# Patient Record
Sex: FEMALE | Race: ASIAN | NOT HISPANIC OR LATINO | Employment: OTHER | URBAN - METROPOLITAN AREA
[De-identification: names, ages, dates, MRNs, and addresses within clinical notes are randomized per-mention and may not be internally consistent; named-entity substitution may affect disease eponyms.]

---

## 2020-01-11 ENCOUNTER — MEDICAL CORRESPONDENCE (OUTPATIENT)
Dept: HEALTH INFORMATION MANAGEMENT | Facility: CLINIC | Age: 25
End: 2020-01-11

## 2020-09-14 ENCOUNTER — TELEPHONE (OUTPATIENT)
Dept: OBGYN | Facility: CLINIC | Age: 25
End: 2020-09-14

## 2020-09-14 NOTE — TELEPHONE ENCOUNTER
Pt called back.  She just moved to [place] from [place] where she had prenatal care.  Pt states she is about 27 weeks.  She is not sure of exact LMP, but states it was sometime in March, 2020.  Pt's last prenatal visit in [place] was at the end of July.    Pt states she would like to deliver at [facility] and states she is not high risk.    Scheduled pt for a New Prenatal visit with VERENICE Paul CNP, on 9/21 and canceled her OB intake appt since she is already 27 weeks along.  Pt will bring her records from [place] to her appt on 9/21.    Susi De La Garza RN

## 2020-09-14 NOTE — TELEPHONE ENCOUNTER
Pt is scheduled for an OB intake appt with a nurse on Thursday, 9/17.  It appears pt is already 27 weeks.    Called pt to get more information, if she plans on delivering at  hospital, if she has had prenatal care, her LMP and probably assist her in scheduling a New Prenatal visit with one of our  OB/GYN providers instead of the OB intake nurse since she is already 27 weeks along.    Unable to reach patient via phone. Left message to call clinic back at [phone] and ask for Women's Health.    Susi De La Garza, RN

## 2020-09-21 ENCOUNTER — PRENATAL OFFICE VISIT (OUTPATIENT)
Dept: OBGYN | Facility: CLINIC | Age: 25
End: 2020-09-21
Payer: MEDICAID

## 2020-09-21 VITALS
SYSTOLIC BLOOD PRESSURE: 120 MMHG | BODY MASS INDEX: 25.11 KG/M2 | HEART RATE: 91 BPM | HEIGHT: 58 IN | TEMPERATURE: 97.7 F | DIASTOLIC BLOOD PRESSURE: 68 MMHG | WEIGHT: 119.6 LBS | OXYGEN SATURATION: 95 %

## 2020-09-21 DIAGNOSIS — R73.09 ABNORMAL GLUCOSE TOLERANCE TEST: Primary | ICD-10-CM

## 2020-09-21 DIAGNOSIS — Z34.80 SUPERVISION OF OTHER NORMAL PREGNANCY: ICD-10-CM

## 2020-09-21 LAB
GLUCOSE 1H P 50 G GLC PO SERPL-MCNC: 130 MG/DL (ref 60–129)
HGB BLD-MCNC: 10.8 G/DL (ref 11.7–15.7)

## 2020-09-21 PROCEDURE — 86780 TREPONEMA PALLIDUM: CPT | Performed by: NURSE PRACTITIONER

## 2020-09-21 PROCEDURE — 99207 ZZC PRENATAL VISIT: CPT | Performed by: NURSE PRACTITIONER

## 2020-09-21 PROCEDURE — 82950 GLUCOSE TEST: CPT | Performed by: NURSE PRACTITIONER

## 2020-09-21 PROCEDURE — 36415 COLL VENOUS BLD VENIPUNCTURE: CPT | Performed by: NURSE PRACTITIONER

## 2020-09-21 PROCEDURE — 85018 HEMOGLOBIN: CPT | Performed by: NURSE PRACTITIONER

## 2020-09-21 ASSESSMENT — MIFFLIN-ST. JEOR: SCORE: 1187.25

## 2020-09-21 ASSESSMENT — PAIN SCALES - GENERAL: PAINLEVEL: NO PAIN (0)

## 2020-09-21 NOTE — PROGRESS NOTES
Patient presents for routine prenatal visit. Transfer of care from [place].   She has her prenatal records visible for review on her phone.  ASHLEE based on her LMP consistent with second trimester ultrasound.  Screening ultrasound in July-normal.  Labs on 6/5/2020:  CBC WNL  HepBsAg NR  RPR NR  Rubella Immune  O Positive, antibody negative  HIV NR  Urine Culture Neg  Chlamydia Neg  Gonorrhea Neg  Pap smear done in 2018 NL    Current pregnancy without concern. First son born with aortic narrowing and abnormal number of aortic valve cusps. Patient has not had any Maternal Fetal Medicine consultation this pregnancy.    Denies vaginal bleeding, loss of fluid, contractions or cramping.  Patient without complaint.  We reviewed prenatal visit schedule, delivery providers, delivery hospital. Continue PNV. Doing her labs today.  Will think about Tdap and flu vaccine.  Advice as per Anticipatory Guidance/Checklist updated.  PE: See OB vitals    Questions asked and answered. Next OB visit in 2 week(s) with MD.  Plan MFM referral.    Georgiana CALDERA CNP

## 2020-09-22 ENCOUNTER — TELEPHONE (OUTPATIENT)
Dept: OBGYN | Facility: CLINIC | Age: 25
End: 2020-09-22

## 2020-09-22 PROBLEM — D50.9 IRON DEFICIENCY ANEMIA: Status: ACTIVE | Noted: 2020-09-22

## 2020-09-22 LAB — T PALLIDUM AB SER QL: NONREACTIVE

## 2020-09-22 NOTE — TELEPHONE ENCOUNTER
RN spoke with  at [facility] who advised 9/25/2020 at 8:00AM to work pt in for 3 hr glucose.    RN called and LM relaying to pt that she has lab appt scheduled on 9/25/2020 at 8:00AM in [facility] and to call back if this does not work.    Wilda Hanson RN on 9/22/2020 at 9:00 AM

## 2020-09-22 NOTE — TELEPHONE ENCOUNTER
Patient calling. She needs a 3 hr GTT test. The lab schedule is booked out far. Call and advise when she can get this done.

## 2020-09-25 DIAGNOSIS — R73.09 ABNORMAL GLUCOSE TOLERANCE TEST: ICD-10-CM

## 2020-09-25 LAB
GLUCOSE 1H P 100 G GLC PO SERPL-MCNC: 129 MG/DL (ref 60–179)
GLUCOSE 2H P 100 G GLC PO SERPL-MCNC: 134 MG/DL (ref 60–154)
GLUCOSE 3H P 100 G GLC PO SERPL-MCNC: 90 MG/DL (ref 60–139)
GLUCOSE P FAST SERPL-MCNC: 73 MG/DL (ref 60–94)

## 2020-09-25 PROCEDURE — 82952 GTT-ADDED SAMPLES: CPT | Performed by: NURSE PRACTITIONER

## 2020-09-25 PROCEDURE — 36415 COLL VENOUS BLD VENIPUNCTURE: CPT | Performed by: NURSE PRACTITIONER

## 2020-09-25 PROCEDURE — 82951 GLUCOSE TOLERANCE TEST (GTT): CPT | Performed by: NURSE PRACTITIONER

## 2020-09-28 ENCOUNTER — TRANSFERRED RECORDS (OUTPATIENT)
Dept: HEALTH INFORMATION MANAGEMENT | Facility: CLINIC | Age: 25
End: 2020-09-28

## 2020-09-29 ENCOUNTER — TRANSCRIBE ORDERS (OUTPATIENT)
Dept: MATERNAL FETAL MEDICINE | Facility: CLINIC | Age: 25
End: 2020-09-29

## 2020-09-29 DIAGNOSIS — O26.90 PREGNANCY RELATED CONDITION, ANTEPARTUM: Primary | ICD-10-CM

## 2020-10-05 ENCOUNTER — PRENATAL OFFICE VISIT (OUTPATIENT)
Dept: OBGYN | Facility: CLINIC | Age: 25
End: 2020-10-05
Payer: COMMERCIAL

## 2020-10-05 VITALS
HEART RATE: 73 BPM | WEIGHT: 120 LBS | TEMPERATURE: 96.9 F | SYSTOLIC BLOOD PRESSURE: 99 MMHG | OXYGEN SATURATION: 97 % | BODY MASS INDEX: 25.19 KG/M2 | DIASTOLIC BLOOD PRESSURE: 67 MMHG | HEIGHT: 58 IN

## 2020-10-05 DIAGNOSIS — Z23 NEED FOR TDAP VACCINATION: ICD-10-CM

## 2020-10-05 DIAGNOSIS — Z34.80 SUPERVISION OF OTHER NORMAL PREGNANCY: Primary | ICD-10-CM

## 2020-10-05 PROCEDURE — 90471 IMMUNIZATION ADMIN: CPT

## 2020-10-05 PROCEDURE — 90715 TDAP VACCINE 7 YRS/> IM: CPT

## 2020-10-05 PROCEDURE — 99207 PR PRENATAL VISIT: CPT | Performed by: NURSE PRACTITIONER

## 2020-10-05 ASSESSMENT — PAIN SCALES - GENERAL: PAINLEVEL: NO PAIN (0)

## 2020-10-05 ASSESSMENT — MIFFLIN-ST. JEOR: SCORE: 1189.07

## 2020-10-05 NOTE — PROGRESS NOTES
Patient presents for routine prenatal visit. Prenatal flowsheet reviewed and updated as needed.  Denies vaginal bleeding, loss of fluid, contractions or cramping. Denies headache, nausea/vomiting, upper abdominal pain, vision changes, lower extremity swelling, chest pain or shortness of breath.  Patient without complaint. Tdap given.  Fetal ECHO scheduled.  Advice as per Anticipatory Guidance/Checklist updated.  PE: See OB vitals    Questions asked and answered. Next OB visit in 2 week(s) with Dr. Mckeon.    Georgiana CALDERA CNP

## 2020-10-05 NOTE — PROGRESS NOTES
Prior to immunization administration, verified patients identity using patient s name and date of birth. Please see Immunization Activity for additional information.     Screening Questionnaire for Adult Immunization    Are you sick today?   No   Do you have allergies to medications, food, a vaccine component or latex?   No   Have you ever had a serious reaction after receiving a vaccination?   No   Do you have a long-term health problem with heart, lung, kidney, or metabolic disease (e.g., diabetes), asthma, a blood disorder, no spleen, complement component deficiency, a cochlear implant, or a spinal fluid leak?  Are you on long-term aspirin therapy?   No   Do you have cancer, leukemia, HIV/AIDS, or any other immune system problem?   No   Do you have a parent, brother, or sister with an immune system problem?   No   In the past 3 months, have you taken medications that affect  your immune system, such as prednisone, other steroids, or anticancer drugs; drugs for the treatment of rheumatoid arthritis, Crohn s disease, or psoriasis; or have you had radiation treatments?   No   Have you had a seizure, or a brain or other nervous system problem?   No   During the past year, have you received a transfusion of blood or blood    products, or been given immune (gamma) globulin or antiviral drug?   No   For women: Are you pregnant or is there a chance you could become       pregnant during the next month?   Yes   Have you received any vaccinations in the past 4 weeks?   No     Immunization questionnaire was positive for at least one answer.  Notified Georgiana CALDERA CNP.        Per orders of Georgaina CALDERA CNP, injection of Tdap given by Cookie Montano CMA. Patient instructed to remain in clinic for 15 minutes afterwards, and to report any adverse reaction to me immediately.       Screening performed by Cookie Montano CMA on 10/5/2020 at 9:37 AM.

## 2020-10-16 ENCOUNTER — HOSPITAL ENCOUNTER (OUTPATIENT)
Dept: CARDIOLOGY | Facility: CLINIC | Age: 25
Discharge: HOME | End: 2020-10-16
Admitting: PEDIATRICS
Payer: COMMERCIAL

## 2020-10-16 DIAGNOSIS — O26.90 PREGNANCY RELATED CONDITION, ANTEPARTUM: ICD-10-CM

## 2020-10-16 PROCEDURE — 99201 PR OFFICE/OUTPT VISIT, NEW, LEVEL I: CPT | Mod: 25 | Performed by: PEDIATRICS

## 2020-10-16 PROCEDURE — 76827 ECHO EXAM OF FETAL HEART: CPT | Mod: 26 | Performed by: PEDIATRICS

## 2020-10-16 PROCEDURE — 76827 ECHO EXAM OF FETAL HEART: CPT

## 2020-10-16 PROCEDURE — 76825 ECHO EXAM OF FETAL HEART: CPT | Mod: 26 | Performed by: PEDIATRICS

## 2020-10-16 PROCEDURE — 93325 DOPPLER ECHO COLOR FLOW MAPG: CPT | Mod: 26 | Performed by: PEDIATRICS

## 2020-10-16 NOTE — PROGRESS NOTES
Fetal Cardiology Consultation    Patient:  Sailaja Mcneal MRN:  [id number]   YOB: 1995 Age:  25 year old   Date of Visit:  10/16/2020 PCP:  Georgiana Estevez APRN CNP   ASHLEE: 2020 EGA: 32+5 weeks     Dear Doctor:    I had the pleasure of seeing Sailaja Mcneal at the [facility] in [place] on 10/16/2020 in consultation for fetal echocardiography results. She presented today by herself. As you know, she is a 25 year old female with previous child with bicuspid aortic valve and aortic arch hypoplasia.    The fetal echocardiogram was normal. Normal fetal cardiac anatomy. Normal right and left ventricular size and function without hypertrophy. No evidence of diastolic dysfunction. No pericardial effusion. No arrhythmia.     I reviewed and interpreted the fetal echocardiogram today. I discussed the normal results with Ms. Mcneal. While these results are normal, it is important to note that fetal echocardiography cannot exclude small atrial or ventricular septal defects, persistent ductus arteriosus, mild coarctation of the aorta, partial anomalous pulmonary venous return, minor anatomic valve anomalies, or coronary artery anomalies.     Thank you for allowing me to participate in Ms. Mcneal's care. Please don't hesitate to contact me or the Fetal Cardiology team at [facility] with any questions or concerns.     -- A post-yuniel echocardiogram as an outpatient within several weeks after delivery is recommended.    I spent a total of 15 minutes face-to-face with Ms. Mcneal during today's office visit. Over 50% of this time was spent counseling the patient and/or coordinating care regarding the fetal echocardiography results.     Rigoberto Morley MD  Pediatric Cardiology  [facility]  Phone [phone]    Addendum: Original note was information for an incorrect patient.

## 2020-10-19 ENCOUNTER — PRENATAL OFFICE VISIT (OUTPATIENT)
Dept: OBGYN | Facility: CLINIC | Age: 25
End: 2020-10-19
Payer: COMMERCIAL

## 2020-10-19 VITALS
WEIGHT: 121.2 LBS | HEART RATE: 107 BPM | SYSTOLIC BLOOD PRESSURE: 101 MMHG | BODY MASS INDEX: 25.44 KG/M2 | DIASTOLIC BLOOD PRESSURE: 64 MMHG | OXYGEN SATURATION: 98 % | HEIGHT: 58 IN

## 2020-10-19 DIAGNOSIS — Z34.80 SUPERVISION OF OTHER NORMAL PREGNANCY: Primary | ICD-10-CM

## 2020-10-19 PROCEDURE — 99207 PR PRENATAL VISIT: CPT | Performed by: OBSTETRICS & GYNECOLOGY

## 2020-10-19 ASSESSMENT — MIFFLIN-ST. JEOR: SCORE: 1189.51

## 2020-10-19 ASSESSMENT — PAIN SCALES - GENERAL: PAINLEVEL: MILD PAIN (2)

## 2020-10-19 NOTE — PROGRESS NOTES
Patient presents for routine prenatal visit at 33w1d.  Patient without complaint.   PE: See OB vitals  There is no height or weight on file to calculate BMI.    No vaginal bleeding, loss of fluid, or contractions    Questions asked and answered.      Frank Mckeon MD FACOG

## 2020-11-05 ENCOUNTER — PRENATAL OFFICE VISIT (OUTPATIENT)
Dept: OBGYN | Facility: CLINIC | Age: 25
End: 2020-11-05
Payer: COMMERCIAL

## 2020-11-05 VITALS
HEIGHT: 58 IN | HEART RATE: 116 BPM | DIASTOLIC BLOOD PRESSURE: 68 MMHG | BODY MASS INDEX: 25.27 KG/M2 | WEIGHT: 120.4 LBS | SYSTOLIC BLOOD PRESSURE: 103 MMHG | OXYGEN SATURATION: 97 %

## 2020-11-05 DIAGNOSIS — D50.9 IRON DEFICIENCY ANEMIA, UNSPECIFIED IRON DEFICIENCY ANEMIA TYPE: ICD-10-CM

## 2020-11-05 DIAGNOSIS — Z34.80 SUPERVISION OF OTHER NORMAL PREGNANCY: Primary | ICD-10-CM

## 2020-11-05 LAB — HGB BLD-MCNC: 11.6 G/DL (ref 11.7–15.7)

## 2020-11-05 PROCEDURE — 99207 PR PRENATAL VISIT: CPT | Performed by: OBSTETRICS & GYNECOLOGY

## 2020-11-05 PROCEDURE — 85018 HEMOGLOBIN: CPT | Performed by: OBSTETRICS & GYNECOLOGY

## 2020-11-05 PROCEDURE — 36415 COLL VENOUS BLD VENIPUNCTURE: CPT | Performed by: OBSTETRICS & GYNECOLOGY

## 2020-11-05 ASSESSMENT — PAIN SCALES - GENERAL: PAINLEVEL: NO PAIN (0)

## 2020-11-05 ASSESSMENT — MIFFLIN-ST. JEOR: SCORE: 1185.88

## 2020-11-05 NOTE — PROGRESS NOTES
Patient presents for routine prenatal visit at 35w4d.  Patient without complaint.   PE: See OB vitals  Body mass index is 25.16 kg/m .    Doing well.  No concerns today.  No vaginal bleeding, loss of fluid, or contractions    Questions asked and answered.      Frank Mckeon MD FACOG

## 2020-11-09 ENCOUNTER — PRENATAL OFFICE VISIT (OUTPATIENT)
Dept: OBGYN | Facility: CLINIC | Age: 25
End: 2020-11-09
Payer: COMMERCIAL

## 2020-11-09 VITALS
HEART RATE: 97 BPM | WEIGHT: 122.2 LBS | BODY MASS INDEX: 25.65 KG/M2 | DIASTOLIC BLOOD PRESSURE: 67 MMHG | OXYGEN SATURATION: 98 % | TEMPERATURE: 97.9 F | SYSTOLIC BLOOD PRESSURE: 100 MMHG | HEIGHT: 58 IN

## 2020-11-09 DIAGNOSIS — Z34.80 SUPERVISION OF OTHER NORMAL PREGNANCY: Primary | ICD-10-CM

## 2020-11-09 DIAGNOSIS — Z23 NEED FOR PROPHYLACTIC VACCINATION AND INOCULATION AGAINST INFLUENZA: ICD-10-CM

## 2020-11-09 PROCEDURE — 90686 IIV4 VACC NO PRSV 0.5 ML IM: CPT | Performed by: NURSE PRACTITIONER

## 2020-11-09 PROCEDURE — 90471 IMMUNIZATION ADMIN: CPT | Performed by: NURSE PRACTITIONER

## 2020-11-09 PROCEDURE — 87653 STREP B DNA AMP PROBE: CPT | Performed by: NURSE PRACTITIONER

## 2020-11-09 PROCEDURE — 99207 PR PRENATAL VISIT: CPT | Performed by: NURSE PRACTITIONER

## 2020-11-09 ASSESSMENT — MIFFLIN-ST. JEOR: SCORE: 1194.05

## 2020-11-09 ASSESSMENT — PAIN SCALES - GENERAL: PAINLEVEL: NO PAIN (0)

## 2020-11-09 NOTE — PROGRESS NOTES
Patient presents for routine prenatal visit. Prenatal flowsheet reviewed and updated as needed.  Denies vaginal bleeding, loss of fluid, contractions or cramping. Denies headache, nausea/vomiting, upper abdominal pain, vision changes, lower extremity swelling, chest pain or shortness of breath.  Patient without complaint. GBS done.  Discussed pediatrician and circumcision question.   Advice as per Anticipatory Guidance/Checklist updated.  PE: See OB vitals    Questions asked and answered. Next OB visit in 1 week(s) with Dr. Mckeon.    Georgiana CALDERA CNP

## 2020-11-10 LAB
GP B STREP DNA SPEC QL NAA+PROBE: NEGATIVE
SPECIMEN SOURCE: NORMAL

## 2020-11-16 ENCOUNTER — PRENATAL OFFICE VISIT (OUTPATIENT)
Dept: OBGYN | Facility: CLINIC | Age: 25
End: 2020-11-16
Payer: COMMERCIAL

## 2020-11-16 VITALS
OXYGEN SATURATION: 96 % | HEART RATE: 86 BPM | BODY MASS INDEX: 25.69 KG/M2 | SYSTOLIC BLOOD PRESSURE: 109 MMHG | TEMPERATURE: 97.2 F | WEIGHT: 122.4 LBS | HEIGHT: 58 IN | DIASTOLIC BLOOD PRESSURE: 71 MMHG

## 2020-11-16 DIAGNOSIS — Z34.80 SUPERVISION OF OTHER NORMAL PREGNANCY: Primary | ICD-10-CM

## 2020-11-16 PROCEDURE — 99207 PR PRENATAL VISIT: CPT | Performed by: NURSE PRACTITIONER

## 2020-11-16 ASSESSMENT — MIFFLIN-ST. JEOR: SCORE: 1194.95

## 2020-11-16 ASSESSMENT — PAIN SCALES - GENERAL: PAINLEVEL: NO PAIN (0)

## 2020-11-16 NOTE — PROGRESS NOTES
Patient presents for routine prenatal visit. Prenatal flowsheet reviewed and updated as needed.  Denies vaginal bleeding, loss of fluid, contractions or cramping. Denies headache, nausea/vomiting, upper abdominal pain, vision changes, lower extremity swelling, chest pain or shortness of breath.  Patient without complaint. Reviewed signs and symptoms of labor and when to proceed to L&D.    Discussed COVID-19 testing at 38 weeks and then need to quarantine. Plan ultrasound for position at next visit.    Advice as per Anticipatory Guidance/Checklist updated.  PE: See OB vitals    Questions asked and answered. Next OB visit in 1 week(s) with Dr. Mckeon.    Georgiana CALDERA CNP

## 2020-11-23 ENCOUNTER — PRENATAL OFFICE VISIT (OUTPATIENT)
Dept: OBGYN | Facility: CLINIC | Age: 25
End: 2020-11-23
Payer: COMMERCIAL

## 2020-11-23 ENCOUNTER — NURSE TRIAGE (OUTPATIENT)
Dept: NURSING | Facility: CLINIC | Age: 25
End: 2020-11-23

## 2020-11-23 VITALS
BODY MASS INDEX: 25.71 KG/M2 | HEART RATE: 102 BPM | WEIGHT: 123 LBS | SYSTOLIC BLOOD PRESSURE: 112 MMHG | OXYGEN SATURATION: 98 % | DIASTOLIC BLOOD PRESSURE: 72 MMHG

## 2020-11-23 DIAGNOSIS — Z34.80 SUPERVISION OF OTHER NORMAL PREGNANCY: Primary | ICD-10-CM

## 2020-11-23 PROCEDURE — 99207 PR PRENATAL VISIT: CPT | Performed by: OBSTETRICS & GYNECOLOGY

## 2020-11-23 PROCEDURE — U0003 INFECTIOUS AGENT DETECTION BY NUCLEIC ACID (DNA OR RNA); SEVERE ACUTE RESPIRATORY SYNDROME CORONAVIRUS 2 (SARS-COV-2) (CORONAVIRUS DISEASE [COVID-19]), AMPLIFIED PROBE TECHNIQUE, MAKING USE OF HIGH THROUGHPUT TECHNOLOGIES AS DESCRIBED BY CMS-2020-01-R: HCPCS | Performed by: OBSTETRICS & GYNECOLOGY

## 2020-11-23 ASSESSMENT — PAIN SCALES - GENERAL: PAINLEVEL: NO PAIN (0)

## 2020-11-23 NOTE — PROGRESS NOTES
Patient presents for routine prenatal visit at 38w1d.  Patient without complaint.   PE: See OB vitals  Body mass index is 25.71 kg/m .  Doing well.  No concerns today.  No vaginal bleeding, LOF, contractions.  No HA, RUQ pain, N/V, visual changes.  Had an exposure to Covid.  Will screen  Questions asked and answered.      Frank Mckeon MD FACOG

## 2020-11-23 NOTE — TELEPHONE ENCOUNTER
"Patient has questions regarding COVID testing.    Yvette Olson RN on 11/23/2020 at 9:34 AM    COVID 19 Nurse Triage Plan/Patient Instructions    Please be aware that novel coronavirus (COVID-19) may be circulating in the community. If you develop symptoms such as fever, cough, or SOB or if you have concerns about the presence of another infection including coronavirus (COVID-19), please contact your health care provider or visit [email or web address].     Disposition/Instructions    Virtual Visit with provider recommended. Reference Visit Selection Guide.  Additional COVID19 information to add for patients.   How can I protect others?  If you have symptoms (fever, cough, body aches or trouble breathing): Stay home and away from others (self-isolate) until:    At least 10 days have passed since your symptoms started, And     You ve had no fever--and no medicine that reduces fever--for 1 full day (24 hours), And      Your other symptoms have resolved (gotten better).     If you don t have symptoms, but a test showed that you have COVID-19 (you tested positive):    Stay home and away from others (self-isolate). Follow the tips under \"How do I self-isolate?\" below for 10 days (20 days if you have a weak immune system).    You don't need to be retested for COVID-19 before going back to school or work. As long as you're fever-free and feeling better, you can go back to school, work and other activities after waiting the 10 or 20 days.     How do I self-isolate?    Stay in your own room, even for meals. Use your own bathroom if you can.     Stay away from others in your home. No hugging, kissing or shaking hands. No visitors.    Don t go to work, school or anywhere else.     Clean  high touch  surfaces often (doorknobs, counters, handles, etc.). Use a household cleaning spray or wipes. You ll find a full list on the EPA website:  www.epa.gov/pesticide-registration/list-n-disinfectants-use-against-sars-cov-2.    Cover your " mouth and nose with a mask, tissue or washcloth to avoid spreading germs.    Wash your hands and face often. Use soap and water.    Caregivers in these groups are at risk for severe illness due to COVID-19:  o People 65 years and older  o People who live in a nursing home or long-term care facility  o People with chronic disease (lung, heart, cancer, diabetes, kidney, liver, immunologic)  o People who have a weakened immune system, including those who:  - Are in cancer treatment  - Take medicine that weakens the immune system, such as corticosteroids  - Had a bone marrow or organ transplant  - Have an immune deficiency  - Have poorly controlled HIV or AIDS  - Are obese (body mass index of 40 or higher)  - Smoke regularly    Caregivers should wear gloves while washing dishes, handling laundry and cleaning bedrooms and bathrooms.    Use caution when washing and drying laundry: Don t shake dirty laundry, and use the warmest water setting that you can.    For more tips, go to www.cdc.gov/coronavirus/2019-ncov/downloads/10Things.pdf.    How can I take care of myself?  1. Get lots of rest. Drink extra fluids (unless a doctor has told you not to).     2. Take Tylenol (acetaminophen) for fever or pain. If you have liver or kidney problems, ask your family doctor if it s okay to take Tylenol.     Adults can take either:     650 mg (two 325 mg pills) every 4 to 6 hours, or     1,000 mg (two 500 mg pills) every 8 hours as needed.     Note: Don t take more than 3,000 mg in one day.   Acetaminophen is found in many medicines (both prescribed and over-the-counter medicines). Read all labels to be sure you don t take too much.     For children, check the Tylenol bottle for the right dose. The dose is based on the child s age or weight.    3. If you have other health problems (like cancer, heart failure, an organ transplant or severe kidney disease): Call your specialty clinic if you don t feel better in the next 2 days.    4. Know  when to call 911: Emergency warning signs include:    Trouble breathing or shortness of breath    Pain or pressure in the chest that doesn t go away    Feeling confused like you haven t felt before, or not being able to wake up    Bluish-colored lips or face    What are the symptoms of COVID-19?     The most common symptoms are cough, fever and trouble breathing.     Less common symptoms include body aches, chills, diarrhea (loose, watery poops), fatigue (feeling very tired), headache, runny nose, sore throat and loss of smell.    COVID-19 can cause severe coughing (bronchitis) and lung infection (pneumonia).    How does it spread?     The virus may spread when a person coughs or sneezes into the air. The virus can travel about 6 feet this way, and it can live on surfaces.      Common  (household disinfectants) will kill the virus.    Who is at risk?  Anyone can catch COVID-19 if they re around someone who has the virus.    How can others protect themselves?     Stay away from people who have COVID-19 (or symptoms of COVID-19).    Wash hands often with soap and water. Or, use hand  with at least 60% alcohol.    Avoid touching the eyes, nose or mouth.     Wear a face mask when you go out in public, when sick or when caring for a sick person.    Where can I get more information?    [facility]: About COVID-19: [email or web address]    CDC: What to Do If You re Sick: www.cdc.gov/coronavirus/2019-ncov/about/steps-when-sick.html    CDC: Ending Home Isolation: www.cdc.gov/coronavirus/2019-ncov/hcp/disposition-in-home-patients.html     CDC: Caring for Someone: www.cdc.gov/coronavirus/2019-ncov/if-you-are-sick/care-for-someone.html     [facility]: Interim Guidance for Hospital Discharge to Home: www.health.[facility].[place]/diseases/coronavirus/hcp/hospdischarge.pdf    [facility] clinical trials (COVID-19 research studies): clinicalaffairs.[facility]-clinical-trials     Below are the  COVID-19 hotlines at the [place] Department of Health ([facility]). Interpreters are available.   o For health questions: Call 976-352-2119 or 1-466.574.6303 (7 a.m. to 7 p.m.)  o For questions about schools and childcare: Call 495-634-7034 or 1-800.680.7075 (7 a.m. to 7 p.m.)          Thank you for taking steps to prevent the spread of this virus.  o Limit your contact with others.  o Wear a simple mask to cover your cough.  o Wash your hands well and often.    Resources    [facility]: About COVID-19: [email or web address]    CDC: What to Do If You're Sick: www.cdc.gov/coronavirus/2019-ncov/about/steps-when-sick.html    CDC: Ending Home Isolation: www.cdc.gov/coronavirus/2019-ncov/hcp/disposition-in-home-patients.html     CDC: Caring for Someone: www.cdc.gov/coronavirus/2019-ncov/if-you-are-sick/care-for-someone.html     [facility]: Interim Guidance for Hospital Discharge to Home: www.[facility].[place]./diseases/coronavirus/hcp/hospdischarge.pdf    [facility] clinical trials (COVID-19 research studies): clinicalaffairs.[facility]/n-clinical-trials     Below are the COVID-19 hotlines at the [place] Department of [facility] ([facility]). Interpreters are available.   o For health questions: Call 615-556-9649 or 1-997.925.3741 (7 a.m. to 7 p.m.)  o For questions about schools and childcare: Call 498-438-2577 or 1-107.178.6158 (7 a.m. to 7 p.m.)

## 2020-11-24 LAB
SARS-COV-2 RNA SPEC QL NAA+PROBE: NOT DETECTED
SPECIMEN SOURCE: NORMAL

## 2020-11-26 ENCOUNTER — MYC MEDICAL ADVICE (OUTPATIENT)
Dept: OBGYN | Facility: CLINIC | Age: 25
End: 2020-11-26

## 2020-12-03 ENCOUNTER — PRENATAL OFFICE VISIT (OUTPATIENT)
Dept: OBGYN | Facility: CLINIC | Age: 25
End: 2020-12-03
Payer: COMMERCIAL

## 2020-12-03 VITALS
DIASTOLIC BLOOD PRESSURE: 69 MMHG | HEART RATE: 115 BPM | BODY MASS INDEX: 26.17 KG/M2 | SYSTOLIC BLOOD PRESSURE: 103 MMHG | WEIGHT: 125.2 LBS

## 2020-12-03 DIAGNOSIS — Z34.80 SUPERVISION OF OTHER NORMAL PREGNANCY: Primary | ICD-10-CM

## 2020-12-03 DIAGNOSIS — D50.8 OTHER IRON DEFICIENCY ANEMIA: ICD-10-CM

## 2020-12-03 PROCEDURE — 99207 PR PRENATAL VISIT: CPT | Performed by: OBSTETRICS & GYNECOLOGY

## 2020-12-03 PROCEDURE — 59426 ANTEPARTUM CARE ONLY: CPT | Performed by: OBSTETRICS & GYNECOLOGY

## 2020-12-03 NOTE — PROGRESS NOTES
Patient presents for routine prenatal visit at 39w4d.  Patient without complaint.   PE: See OB vitals  Body mass index is 26.17 kg/m .  Doing well.  No concerns today.  No vaginal bleeding, LOF, contractions.  No HA, RUQ pain, N/V, visual changes.  Questions asked and answered.      Frank Mckeon MD FACOG

## 2021-01-04 ENCOUNTER — HEALTH MAINTENANCE LETTER (OUTPATIENT)
Age: 26
End: 2021-01-04

## 2021-01-11 ENCOUNTER — PRENATAL OFFICE VISIT (OUTPATIENT)
Dept: OBGYN | Facility: CLINIC | Age: 26
End: 2021-01-11
Payer: COMMERCIAL

## 2021-01-11 VITALS
DIASTOLIC BLOOD PRESSURE: 79 MMHG | HEART RATE: 86 BPM | HEIGHT: 58 IN | OXYGEN SATURATION: 94 % | WEIGHT: 108 LBS | SYSTOLIC BLOOD PRESSURE: 124 MMHG | BODY MASS INDEX: 22.67 KG/M2 | TEMPERATURE: 97.9 F

## 2021-01-11 DIAGNOSIS — Z30.011 ENCOUNTER FOR INITIAL PRESCRIPTION OF CONTRACEPTIVE PILLS: ICD-10-CM

## 2021-01-11 PROBLEM — Z34.80 SUPERVISION OF OTHER NORMAL PREGNANCY: Status: RESOLVED | Noted: 2020-09-21 | Resolved: 2021-01-11

## 2021-01-11 PROCEDURE — G0145 SCR C/V CYTO,THINLAYER,RESCR: HCPCS | Performed by: NURSE PRACTITIONER

## 2021-01-11 PROCEDURE — 99207 PR POST PARTUM EXAM: CPT | Performed by: NURSE PRACTITIONER

## 2021-01-11 RX ORDER — LEVONORGESTREL/ETHIN.ESTRADIOL 0.1-0.02MG
1 TABLET ORAL DAILY
Qty: 84 TABLET | Refills: 3 | Status: SHIPPED | OUTPATIENT
Start: 2021-01-11 | End: 2021-08-13

## 2021-01-11 ASSESSMENT — PATIENT HEALTH QUESTIONNAIRE - PHQ9
SUM OF ALL RESPONSES TO PHQ QUESTIONS 1-9: 1
SUM OF ALL RESPONSES TO PHQ QUESTIONS 1-9: 1

## 2021-01-11 ASSESSMENT — MIFFLIN-ST. JEOR: SCORE: 1129.63

## 2021-01-11 ASSESSMENT — PAIN SCALES - GENERAL: PAINLEVEL: NO PAIN (0)

## 2021-01-11 NOTE — PROGRESS NOTES
Sailaja is here for a postpartum checkup.    She had a   OB History    Para Term  AB Living   2 2 2 0 0 2   SAB TAB Ectopic Multiple Live Births   0 0 0 0 2      # Outcome Date GA Lbr Edu/2nd Weight Sex Delivery Anes PTL Lv   2 Term 20 40w2d  3.6 kg (7 lb 15 oz) M  None N MARISOL      Apgar1: 8  Apgar5: 9   1 Term 2019 40w0d       MARISOL      Since delivery, she has been pumping, currently expanding times between pumping sessions with plan to discontinue soon.  She has had a normal menses-just started.  She has not had intercourse.  Patient screened for postpartum depression and complaints are NEGATIVE. Screening has also been completed for intimate partner violence. She would like to discuss contraception.    O: This is a well appearing female in no acute distress. Answers questions and maintains eye contact appropriately. Vital signs noted.  ABDOMEN: Soft, nontender, nondistended, normoactive bowel sounds. No hepatosplenomegaly. No guarding, rebounding, or rigidity.  Vulva: No external lesions, normal hair distribution, no adenopathy  BUS:  Normal, no masses noted  Vagina: Moist, pink, no abnormal discharge, well rugated, no lesions  Cervix: Pink, parous, midline. Without cervical motion tenderness.  Uterus: Normal size and shape, non-tender, mobile  Ovaries: No masses, non-tender, mobile    A/P:  (Z39.2) Routine postpartum follow-up  (primary encounter diagnosis)  Comment:  Discussed the new pap recommendations regarding screening.  Explained the rationale for increased intervals between paps.  Questions asked and answered.  She does agree to this regiment.  Plan: Pap imaged thin layer screen only - recommended        age 21 - 24 years        (Z30.011) Encounter for initial prescription of contraceptive pills  Comment: Pumping with plan to discontinue soon. Discussed options and she would like a combined oral contraceptive pill. Discussed when to start the pill, taking it at the same  time every day, possible side effects she may experience and when it will become effective.   Plan: levonorgestrel-ethinyl estradiol (AVIANE)         0.1-20 MG-MCG tablet        Georgiana CALDERA CNP      Answers for HPI/ROS submitted by the patient on 1/11/2021   PHQ9 TOTAL SCORE: 1

## 2021-01-12 ASSESSMENT — PATIENT HEALTH QUESTIONNAIRE - PHQ9: SUM OF ALL RESPONSES TO PHQ QUESTIONS 1-9: 1

## 2021-01-13 LAB
COPATH REPORT: NORMAL
PAP: NORMAL

## 2021-01-27 NOTE — PROGRESS NOTES
"  Assessment & Plan     Vaginal discharge  Patient notified of results, during visit we had discussed management if test was negative, reviewed home care measures she can try. Discussed use of OTC Hydrocortisone for vulvar itching sparingly twice daily as needed for up to 1 week. Will monitor symptoms until after next cycle and let me know if they continue.  She was amenable to the plan.   - Wet prep    VERENICE Johnson CNP  [facility]    Jeremie Menard is a 25 year old who presents to clinic today for the following health issues    HPI     Vaginal Symptoms  Onset/Duration: 1 week ago  Description:  Vaginal Discharge: yellow   Itching (Pruritis): YES  Burning sensation:  no  Odor: YES  Accompanying Signs & Symptoms:  Urinary symptoms: no  Abdominal pain: no  Fever: no  History:   Sexually active: YES  New Partner: no  Possibility of Pregnancy:  no  Recent antibiotic use: no  Previous vaginitis issues: no  Precipitating or alleviating factors: None  Therapies tried and outcome: none    Symptoms began as menses was ending. Increase in thick vaginal discharge, odor and mild itching. Denies abnormal urinary symptoms, pelvic pain, STI concerns, fever, nausea. Changed body wash about a month ago, otherwise no new products.    Review of Systems   Constitutional, HEENT, cardiovascular, pulmonary, gi and gu systems are negative, except as otherwise noted.      Objective    /73 (BP Location: Right arm, Patient Position: Sitting, Cuff Size: Adult Regular)   Pulse 91   Temp 98.6  F (37  C) (Tympanic)   Ht 1.473 m (4' 10\")   Wt 48.4 kg (106 lb 9.6 oz)   LMP 01/10/2021 (Approximate)   SpO2 97%   BMI 22.28 kg/m    Body mass index is 22.28 kg/m .  Physical Exam   GENERAL: healthy, alert and no distress  ABDOMEN: soft, nontender, no hepatosplenomegaly, no masses and bowel sounds normal   (female): normal female external genitalia, normal urethral meatus , vaginal mucosa pink, " moist, well rugated and vaginal discharge - small amount of yellow and thick  MS: no gross musculoskeletal defects noted, no edema  SKIN: no suspicious lesions or rashes  PSYCH: mentation appears normal, affect normal/bright    Results for orders placed or performed in visit on 01/29/21 (from the past 24 hour(s))   Wet prep    Specimen: Vagina   Result Value Ref Range    Specimen Description Vagina     Wet Prep No Trichomonas seen     Wet Prep No clue cells seen     Wet Prep No yeast seen     Wet Prep Few  WBC'S seen

## 2021-01-29 ENCOUNTER — OFFICE VISIT (OUTPATIENT)
Dept: OBGYN | Facility: CLINIC | Age: 26
End: 2021-01-29
Payer: COMMERCIAL

## 2021-01-29 VITALS
TEMPERATURE: 98.6 F | BODY MASS INDEX: 22.37 KG/M2 | DIASTOLIC BLOOD PRESSURE: 73 MMHG | SYSTOLIC BLOOD PRESSURE: 106 MMHG | HEIGHT: 58 IN | HEART RATE: 91 BPM | WEIGHT: 106.6 LBS | OXYGEN SATURATION: 97 %

## 2021-01-29 DIAGNOSIS — N89.8 VAGINAL DISCHARGE: Primary | ICD-10-CM

## 2021-01-29 LAB
SPECIMEN SOURCE: NORMAL
WET PREP SPEC: NORMAL

## 2021-01-29 PROCEDURE — 99213 OFFICE O/P EST LOW 20 MIN: CPT | Performed by: NURSE PRACTITIONER

## 2021-01-29 PROCEDURE — 87210 SMEAR WET MOUNT SALINE/INK: CPT | Performed by: NURSE PRACTITIONER

## 2021-01-29 ASSESSMENT — PAIN SCALES - GENERAL: PAINLEVEL: NO PAIN (0)

## 2021-01-29 ASSESSMENT — MIFFLIN-ST. JEOR: SCORE: 1123.28

## 2021-02-11 ENCOUNTER — MEDICAL CORRESPONDENCE (OUTPATIENT)
Dept: HEALTH INFORMATION MANAGEMENT | Facility: CLINIC | Age: 26
End: 2021-02-11

## 2021-04-08 ENCOUNTER — MEDICAL CORRESPONDENCE (OUTPATIENT)
Dept: HEALTH INFORMATION MANAGEMENT | Facility: CLINIC | Age: 26
End: 2021-04-08

## 2021-04-28 ENCOUNTER — IMMUNIZATION (OUTPATIENT)
Dept: NURSING | Facility: CLINIC | Age: 26
End: 2021-04-28
Payer: COMMERCIAL

## 2021-04-28 PROCEDURE — 91300 PR COVID VAC PFIZER DIL RECON 30 MCG/0.3 ML IM: CPT

## 2021-04-28 PROCEDURE — 0001A PR COVID VAC PFIZER DIL RECON 30 MCG/0.3 ML IM: CPT

## 2021-05-19 ENCOUNTER — IMMUNIZATION (OUTPATIENT)
Dept: NURSING | Facility: CLINIC | Age: 26
End: 2021-05-19
Attending: FAMILY MEDICINE
Payer: COMMERCIAL

## 2021-05-19 PROCEDURE — 0002A PR COVID VAC PFIZER DIL RECON 30 MCG/0.3 ML IM: CPT

## 2021-05-19 PROCEDURE — 91300 PR COVID VAC PFIZER DIL RECON 30 MCG/0.3 ML IM: CPT

## 2021-07-06 ENCOUNTER — MYC MEDICAL ADVICE (OUTPATIENT)
Dept: OBGYN | Facility: CLINIC | Age: 26
End: 2021-07-06

## 2021-07-06 NOTE — TELEPHONE ENCOUNTER
May be related to her Multivitamin, but is not usually a side effect of the oral contraceptive pill-especially after this many months. If symptoms persist, can be evaluated by primary care. They can also check for any iron deficiency. Georgiana CALDERA CNP

## 2021-07-06 NOTE — TELEPHONE ENCOUNTER
Pt last seen 1/29/2021 for vaginal discharge. Pt currently taking levonorgestrel-ethinyl estradiol (AVIANE) 0.1-20 MG-MCG tablet    Pt having concerns for indigestion since starting on this BC and asking if there could be any correlation.    Pt denies any lifestyle or diet changes. Pt thinks she is having acid reflux, states she has regular BMs. Pt states she has not noticed changes in indigestion throughout her cycle. Started taking multivitamins in July, thinks she maybe had an iron deficiency.    RN routing to provider for advisement if BCPs could have effect on indigestion or if pt should f/u with FP.    Wilda Hanson RN on 7/6/2021 at 11:29 AM

## 2021-07-08 ENCOUNTER — RECORDS - HEALTHEAST (OUTPATIENT)
Dept: SCHEDULING | Facility: CLINIC | Age: 26
End: 2021-07-08

## 2021-07-08 ENCOUNTER — RECORDS - HEALTHEAST (OUTPATIENT)
Dept: ADMINISTRATIVE | Facility: OTHER | Age: 26
End: 2021-07-08

## 2021-07-08 ENCOUNTER — OFFICE VISIT (OUTPATIENT)
Dept: FAMILY MEDICINE | Facility: CLINIC | Age: 26
End: 2021-07-08
Payer: COMMERCIAL

## 2021-07-08 VITALS
HEART RATE: 96 BPM | OXYGEN SATURATION: 98 % | DIASTOLIC BLOOD PRESSURE: 62 MMHG | TEMPERATURE: 98.4 F | SYSTOLIC BLOOD PRESSURE: 100 MMHG | WEIGHT: 118 LBS | BODY MASS INDEX: 24.66 KG/M2

## 2021-07-08 DIAGNOSIS — R10.11 RUQ ABDOMINAL PAIN: Primary | ICD-10-CM

## 2021-07-08 DIAGNOSIS — R10.11 RUQ ABDOMINAL PAIN: ICD-10-CM

## 2021-07-08 PROCEDURE — 99204 OFFICE O/P NEW MOD 45 MIN: CPT | Performed by: FAMILY MEDICINE

## 2021-07-08 RX ORDER — FAMOTIDINE 40 MG/1
40 TABLET, FILM COATED ORAL 2 TIMES DAILY
COMMUNITY
Start: 2021-07-07 | End: 2021-08-10

## 2021-07-08 NOTE — PROGRESS NOTES
Assessment & Plan     RUQ abdominal pain  - Gall bladder disease vs gastritis  - Pregnancy test and UA at  recently were negative  - Advised RUQ US  - Continue Pepcid BID for now   - US Abdomen Limited; Future      Return in about 1 week (around 7/15/2021) for US results.    DO STEPHANIE Barnes [facility] XIOMY    ============================================================    Subjective   Sailaja is a 25 year old who presents for the following health issues    HPI     ED/UC Followup:    Facility:  [facility]  [place]  Date of visit: 7/7/21  Reason for visit: abdominal cramping  Current Status: still having slight pain on and off     This is a new patient to our clinic.  Seen at [facility] recently for abdominal pain that started after she began taking OCPs.  RUQ US was recommended, but pt did not have time to complete it.  GI cocktail did help her symptoms.      Abdominal/Flank Pain  Onset/Duration: Few months  Description:   Character: Stabbing  Location: epigastric region right upper quadrant  Radiation: Back  Intensity: intermittent   Progression of Symptoms:  intermittent  Accompanying Signs & Symptoms:  Fever/Chills: no  Gas/Bloating: no  Nausea: no  Vomitting: no  Diarrhea: no  Constipation: no  Dysuria or Hematuria: no  History:   Trauma: no  Previous similar pain: no  Previous tests done: none  Precipitating factors:   Does the pain change with:     Food: Sometimes     Bowel Movement: no    Urination: no   Other factors:  Worse at night   Therapies tried and outcome: GI cocktail was helpful  No LMP recorded.    Review of Systems   Constitutional, HEENT, cardiovascular, pulmonary, gi and gu systems are negative, except as otherwise noted.      Objective    /62 (BP Location: Right arm, Patient Position: Chair, Cuff Size: Adult Regular)   Pulse 96   Temp 98.4  F (36.9  C) (Oral)   Wt 53.5 kg (118 lb)   SpO2 98%   BMI 24.66 kg/m    Body mass index is  24.66 kg/m .  Physical Exam   GENERAL: healthy, alert and no distress  RESP: lungs clear to auscultation - no rales, rhonchi or wheezes  CV: regular rates and rhythm, normal S1 S2, no S3 or S4 and no murmur, click or rub  ABDOMEN: tenderness RUQ and no organomegaly or masses

## 2021-07-08 NOTE — PROGRESS NOTES
"    {PROVIDER CHARTING PREFERENCE:537080}    Subjective   Sailaja is a 25 year old who presents for the following health issues {ACCOMPANIED BY STATEMENT (Optional):823639}    HPI     Abdominal/Flank Pain  Onset/Duration: ***  Description:   Character: {.:747138}  Location: {.:075433}  Radiation: {.:278313::\"None\"}  Intensity: {.:816124}  Progression of Symptoms:  {.:567633}  Accompanying Signs & Symptoms:  Fever/Chills: {.:863321::\"no\"}  Gas/Bloating: {.:354326::\"no\"}  Nausea: {.:644280::\"no\"}  Vomitting: {.:468439::\"no\"}  Diarrhea: {.:100769::\"no\"}  Constipation: {.:809938::\"no\"}  Dysuria or Hematuria: {.:950390::\"no\"}  History:   Trauma: {.:145210::\"no\"}  Previous similar pain: {.:694730::\"no\"}  Previous tests done: { .:103854}  Precipitating factors:   Does the pain change with:     Food: {.:205142::\"no\"}    Bowel Movement: {.:404798::\"no\"}    Urination: {.:082030::\"no\"}   Other factors:  {.:088395::\"no\"}  Therapies tried and outcome: {NONEORCHOOSE:720548::\"None\"}  No LMP recorded.      {additonal problems for provider to add (Optional):882962}    Review of Systems   {ROS COMP (Optional):783761}      Objective    There were no vitals taken for this visit.  There is no height or weight on file to calculate BMI.  Physical Exam   {Exam List (Optional):601552}    {Diagnostic Test Results (Optional):797177}    {AMBULATORY ATTESTATION (Optional):461400}        "

## 2021-07-08 NOTE — PATIENT INSTRUCTIONS
Patient Education     GERD (Adult)    The esophagus is a tube that carries food from the mouth to the stomach. A valve (the LES, lower esophageal sphincter) at the lower end of the esophagus prevents stomach acid from flowing upward. When this valve doesn't work properly, stomach contents may repeatedly flow back up (reflux) into the esophagus. This is called gastroesophageal reflux disease (GERD). GERD can irritate the esophagus. It can cause problems with pain, swallowing or breathing. In severe cases, GERD can cause recurrent pneumonia (from aspiration or breathing in particles) or other serious problems.  Symptoms of reflux include burning, pressure or sharp pain in the upper abdomen or mid to lower chest. The pain can spread to the neck, back, or shoulder. There may be belching, an acid taste in the back of the throat, chronic cough, or sore throat, or hoarseness. GERD symptoms often occur during the day after a big meal. They can also occur at night when lying down.   Home care  Lifestyle changes can help reduce symptoms. If needed, your healthcare provider may prescribe medicines. Symptoms often improve with treatment, but if treatment is stopped, the symptoms often return after a few months. So most persons with GERD will need to continue treatment or get treatment on and off.  Lifestyle changes    Limit or avoid fatty, fried, and spicy foods, as well as coffee, chocolate, mint, and foods with high acid content such as tomatoes and citrus fruit and juices (orange, grapefruit, lemon).    Don t eat large meals, especially at night. Frequent, smaller meals are best. Don't lie down right after eating. And don t eat anything 3 hours before going to bed.    Don't drink alcohol or smoke. As much as possible, stay away from second hand smoke.    If you are overweight, losing weight will reduce symptoms.     Don't wear tight clothing around your stomach area.    If your symptoms occur during sleep, use a foam wedge  "to elevate your upper body (not just your head.) Or, place 4\" blocks under the head of your bed. Or use 2 bed risers under your bedframe.  Medicines  If needed, medicines can help relieve the symptoms of GERD and prevent damage to the esophagus. Discuss a medicine plan with your healthcare provider. This may include one or more of the following medicines:    Antacids to help neutralize the normal acids in your stomach.    Acid blockers (Histamine or H2 blockers) to decrease acid production.    Acid inhibitors (proton pump inhibitors PPIs) to decrease acid production in a different way than the blockers. They may work better, but can take a little longer to take effect.  Take an antacid 30 to 60 minutes after eating and at bedtime, but not at the same time as an acid blocker.  Try not to take medicines such as ibuprofen and aspirin. If you are taking aspirin for your heart or other medical reasons, talk to your healthcare provider about stopping it.  Follow-up care  Follow up with your healthcare provider or as advised by our staff.  When to seek medical advice  Call your healthcare provider if any of the following occur:    Stomach pain gets worse or moves to the lower right abdomen (appendix area)    Chest pain appears or gets worse, or spreads to the back, neck, shoulder, or arm    An over-the-counter trial of medicine doesn't relieve your symptoms    Weight loss that can't be explained    Trouble or pain swallowing    Frequent vomiting (can t keep down liquids)    Blood in the stool or vomit (red or black in color)    Feeling weak or dizzy    Fever of 100.4 F (38 C) or higher, or as directed by your healthcare provider  Yessi last reviewed this educational content on 3/1/2018    7075-5172 [employer]. All rights reserved. This information is not intended as a substitute for professional medical care. Always follow your healthcare professional's instructions.           "

## 2021-07-15 ENCOUNTER — HOSPITAL ENCOUNTER (OUTPATIENT)
Dept: ULTRASOUND IMAGING | Facility: HOSPITAL | Age: 26
Discharge: HOME | End: 2021-07-15
Attending: FAMILY MEDICINE | Admitting: FAMILY MEDICINE
Payer: COMMERCIAL

## 2021-07-15 DIAGNOSIS — K80.20 CALCULUS OF GALLBLADDER WITHOUT CHOLECYSTITIS WITHOUT OBSTRUCTION: Primary | ICD-10-CM

## 2021-07-15 DIAGNOSIS — R10.11 RUQ ABDOMINAL PAIN: ICD-10-CM

## 2021-07-15 PROCEDURE — 76705 ECHO EXAM OF ABDOMEN: CPT

## 2021-07-29 ENCOUNTER — OFFICE VISIT (OUTPATIENT)
Dept: SURGERY | Facility: CLINIC | Age: 26
End: 2021-07-29
Attending: FAMILY MEDICINE
Payer: COMMERCIAL

## 2021-07-29 VITALS
HEART RATE: 64 BPM | SYSTOLIC BLOOD PRESSURE: 121 MMHG | BODY MASS INDEX: 24.87 KG/M2 | DIASTOLIC BLOOD PRESSURE: 80 MMHG | WEIGHT: 119 LBS

## 2021-07-29 DIAGNOSIS — K80.20 CALCULUS OF GALLBLADDER WITHOUT CHOLECYSTITIS WITHOUT OBSTRUCTION: ICD-10-CM

## 2021-07-29 PROCEDURE — 99243 OFF/OP CNSLTJ NEW/EST LOW 30: CPT | Performed by: SURGERY

## 2021-07-29 NOTE — LETTER
7/29/2021         RE: Sailaja Mcneal  [address]  [facility] [address]        Dear Colleague,    Thank you for referring your patient, Sailaja Mcneal, to the [facility]. Please see a copy of my visit note below.    Assessment and Plan:  It is my impression that Sailaja has cholelithiasis.   She also has GERD and I think that is the source of her pain. Continue PEPCID    Reviewed what gallbladder pain looks like and what to be concerned with. She will return if she has any of those. Literature given to patient to review.     Chief complaint:  Epigastric pain    HPI:  This patient is a 25 year old female who presents with intermittent epigastric pain for about three weeks. She has been having heartburn since she started birth control.  The pain is not associated with eating fatty foods.  Negative for associated symptoms of nausea, vomiting, fever and chills. She does not have a history of jaundice.  She  has not had pancreatitis in the past.  Pain is better with heart burn medicine, she forgot to take it last week and the heartburn returned as did the abdominal pain. This resolved once she resumed the famotidine.     Past Medical History:   has a past medical history of No pertinent past medical history.    Past Surgical History:  Past Surgical History:   Procedure Laterality Date     NO HISTORY OF SURGERY        Additional abdominal operations: none        Social History:  Social History     Socioeconomic History     Marital status: Single     Spouse name: Not on file     Number of children: Not on file     Years of education: Not on file     Highest education level: Not on file   Occupational History     Not on file   Tobacco Use     Smoking status: Never Smoker     Smokeless tobacco: Never Used   Substance and Sexual Activity     Alcohol use: Yes     Comment: Special occasions only.     Drug use: Never     Sexual activity: Yes     Partners: Male     Birth control/protection: Pill    Other Topics Concern     Parent/sibling w/ CABG, MI or angioplasty before 65F 55M? No   Social History Narrative     Not on file     Social Determinants of Health     Financial Resource Strain:      Difficulty of Paying Living Expenses:    Food Insecurity:      Worried About Running Out of Food in the Last Year:      Ran Out of Food in the Last Year:    Transportation Needs:      Lack of Transportation (Medical):      Lack of Transportation (Non-Medical):    Physical Activity:      Days of Exercise per Week:      Minutes of Exercise per Session:    Stress:      Feeling of Stress :    Social Connections:      Frequency of Communication with Friends and Family:      Frequency of Social Gatherings with Friends and Family:      Attends Scientologist Services:      Active Member of Clubs or Organizations:      Attends Club or Organization Meetings:      Marital Status:    Intimate Partner Violence:      Fear of Current or Ex-Partner:      Emotionally Abused:      Physically Abused:      Sexually Abused:         Family History:  Family History   Problem Relation Age of Onset     Cholelithiasis Sister      Gallbladder disease: Yes - sister  Mother was hospitalized with severe GERD    Review of Systems:  The 10 point review of systems is negative other than noted in the HPI and above.    Physical Exam:  /80   Pulse 64   Wt 54 kg (119 lb)   BMI 24.87 kg/m    Constitutional: healthy, alert and no distress  Eyes: Pupils round and equal, no icterus   ENT: Mucous membranes moist  Respiratory:  Non-labored respiration  Gastrointestinal: Abdomen soft, non-tender.  No masses, organomegaly  Musculoskeletal: No gross deformity  Neurologic: No gross focal deficits  Psychiatric: mentation appears normal and affect normal/bright  Hematologic/Lymphatic/Immunologic: No bruising noted  Skin: No lesions, rashes, erythema or jaundice noted      Relevant labs:    Hemoglobin   Date Value Ref Range Status   11/05/2020 11.6 (L) 11.7 -  15.7 g/dL Final     Comment:     Results confirmed by repeat test     Imaging:  Recent Results (from the past 744 hour(s))   US Abdomen Limited    Narrative    EXAM: US ABDOMEN LIMITED  LOCATION: [facility]  DATE/TIME: 7/15/2021 7:51 AM    INDICATION: RUQ pain, rule out gall bladder disease  COMPARISON: None.  TECHNIQUE: Limited abdominal ultrasound.    FINDINGS:    GALLBLADDER: There is a 1.5 cm stone involving the gallbladder. No Madison's sign.    BILE DUCTS: No biliary dilatation. The common duct measures 3 mm.    LIVER: Normal parenchyma with smooth contour. No focal mass.    RIGHT KIDNEY: No hydronephrosis.    PANCREAS: The visualized portions are normal.    No ascites.      Impression    IMPRESSION:  1.  Cholelithiasis without acute cholecystitis.         Richard Benitez DO          Again, thank you for allowing me to participate in the care of your patient.        Sincerely,        Richard Benitez, DO

## 2021-07-29 NOTE — PROGRESS NOTES
Assessment and Plan:  It is my impression that Sailaja has cholelithiasis.   She also has GERD and I think that is the source of her pain. Continue PEPCID    Reviewed what gallbladder pain looks like and what to be concerned with. She will return if she has any of those. Literature given to patient to review.     Chief complaint:  Epigastric pain    HPI:  This patient is a 25 year old female who presents with intermittent epigastric pain for about three weeks. She has been having heartburn since she started birth control.  The pain is not associated with eating fatty foods.  Negative for associated symptoms of nausea, vomiting, fever and chills. She does not have a history of jaundice.  She  has not had pancreatitis in the past.  Pain is better with heart burn medicine, she forgot to take it last week and the heartburn returned as did the abdominal pain. This resolved once she resumed the famotidine.     Past Medical History:   has a past medical history of No pertinent past medical history.    Past Surgical History:  Past Surgical History:   Procedure Laterality Date     NO HISTORY OF SURGERY        Additional abdominal operations: none        Social History:  Social History     Socioeconomic History     Marital status: Single     Spouse name: Not on file     Number of children: Not on file     Years of education: Not on file     Highest education level: Not on file   Occupational History     Not on file   Tobacco Use     Smoking status: Never Smoker     Smokeless tobacco: Never Used   Substance and Sexual Activity     Alcohol use: Yes     Comment: Special occasions only.     Drug use: Never     Sexual activity: Yes     Partners: Male     Birth control/protection: Pill   Other Topics Concern     Parent/sibling w/ CABG, MI or angioplasty before 65F 55M? No   Social History Narrative     Not on file     Social Determinants of Health     Financial Resource Strain:      Difficulty of Paying Living Expenses:    Food  Insecurity:      Worried About Running Out of Food in the Last Year:      Ran Out of Food in the Last Year:    Transportation Needs:      Lack of Transportation (Medical):      Lack of Transportation (Non-Medical):    Physical Activity:      Days of Exercise per Week:      Minutes of Exercise per Session:    Stress:      Feeling of Stress :    Social Connections:      Frequency of Communication with Friends and Family:      Frequency of Social Gatherings with Friends and Family:      Attends Sikhism Services:      Active Member of Clubs or Organizations:      Attends Club or Organization Meetings:      Marital Status:    Intimate Partner Violence:      Fear of Current or Ex-Partner:      Emotionally Abused:      Physically Abused:      Sexually Abused:         Family History:  Family History   Problem Relation Age of Onset     Cholelithiasis Sister      Gallbladder disease: Yes - sister  Mother was hospitalized with severe GERD    Review of Systems:  The 10 point review of systems is negative other than noted in the HPI and above.    Physical Exam:  /80   Pulse 64   Wt 54 kg (119 lb)   BMI 24.87 kg/m    Constitutional: healthy, alert and no distress  Eyes: Pupils round and equal, no icterus   ENT: Mucous membranes moist  Respiratory:  Non-labored respiration  Gastrointestinal: Abdomen soft, non-tender.  No masses, organomegaly  Musculoskeletal: No gross deformity  Neurologic: No gross focal deficits  Psychiatric: mentation appears normal and affect normal/bright  Hematologic/Lymphatic/Immunologic: No bruising noted  Skin: No lesions, rashes, erythema or jaundice noted      Relevant labs:    Hemoglobin   Date Value Ref Range Status   11/05/2020 11.6 (L) 11.7 - 15.7 g/dL Final     Comment:     Results confirmed by repeat test     Imaging:  Recent Results (from the past 744 hour(s))   US Abdomen Limited    Narrative    EXAM: US ABDOMEN LIMITED  LOCATION: [facility]  DATE/TIME: 7/15/2021 7:51  AM    INDICATION: RUQ pain, rule out gall bladder disease  COMPARISON: None.  TECHNIQUE: Limited abdominal ultrasound.    FINDINGS:    GALLBLADDER: There is a 1.5 cm stone involving the gallbladder. No Madison's sign.    BILE DUCTS: No biliary dilatation. The common duct measures 3 mm.    LIVER: Normal parenchyma with smooth contour. No focal mass.    RIGHT KIDNEY: No hydronephrosis.    PANCREAS: The visualized portions are normal.    No ascites.      Impression    IMPRESSION:  1.  Cholelithiasis without acute cholecystitis.         Richard Benitez, DO

## 2021-08-13 DIAGNOSIS — Z30.011 ENCOUNTER FOR INITIAL PRESCRIPTION OF CONTRACEPTIVE PILLS: ICD-10-CM

## 2021-08-13 RX ORDER — TIMOLOL MALEATE 5 MG/ML
SOLUTION/ DROPS OPHTHALMIC
Qty: 84 TABLET | Refills: 1 | Status: SHIPPED | OUTPATIENT
Start: 2021-08-13 | End: 2023-06-19

## 2021-08-13 NOTE — TELEPHONE ENCOUNTER
Note from pharmacy stated zero refills were remaining. Prescription refilled x 6 months. Georgiana CALDERA CNP

## 2021-08-13 NOTE — TELEPHONE ENCOUNTER
"Requested Prescriptions   Pending Prescriptions Disp Refills     VIENVA 0.1-20 MG-MCG tablet [Pharmacy Med Name: VIENVA 0.1MG/0.02MG TABS 28S] 84 tablet 3     Sig: TAKE 1 TABLET BY MOUTH EVERY DAY       Contraceptives Protocol Passed - 8/13/2021  2:19 PM        Passed - Patient is not a current smoker if age is 35 or older        Passed - Recent (12 mo) or future (30 days) visit within the authorizing provider's specialty     Patient has had an office visit with the authorizing provider or a provider within the authorizing providers department within the previous 12 mos or has a future within next 30 days. See \"Patient Info\" tab in inbasket, or \"Choose Columns\" in Meds & Orders section of the refill encounter.              Passed - Medication is active on med list        Passed - No active pregnancy on record        Passed - No positive pregnancy test in past 12 months           Pt last seen 1/29/2021 for vaginal discharge    Last prescribed 1/11/2021 for 84 tablets with 3 refills.    Refills remain at patient's pharmacy. Refill not appropriate at this time, because there are additional refills remaining on current presciption    RN unable to close encounter without signing to discontinue medication. RN routing to provider to close refill request.    Wilda Hanson RN on 8/13/2021 at 2:30 PM      "

## 2021-09-07 ASSESSMENT — ENCOUNTER SYMPTOMS
MYALGIAS: 0
HEMATOCHEZIA: 0
NAUSEA: 1
WEAKNESS: 0
ARTHRALGIAS: 1
CHILLS: 0
NERVOUS/ANXIOUS: 0
HEADACHES: 0
HEMATURIA: 0
FEVER: 0
JOINT SWELLING: 0
FREQUENCY: 0
HEARTBURN: 0
PARESTHESIAS: 0
BREAST MASS: 0
COUGH: 0
ABDOMINAL PAIN: 0
PALPITATIONS: 0
EYE PAIN: 0
DIARRHEA: 0
SHORTNESS OF BREATH: 0
SORE THROAT: 0
DYSURIA: 0
DIZZINESS: 0
CONSTIPATION: 0

## 2021-09-10 ENCOUNTER — OFFICE VISIT (OUTPATIENT)
Dept: FAMILY MEDICINE | Facility: CLINIC | Age: 26
End: 2021-09-10
Payer: COMMERCIAL

## 2021-09-10 VITALS
WEIGHT: 119.2 LBS | BODY MASS INDEX: 24.91 KG/M2 | SYSTOLIC BLOOD PRESSURE: 107 MMHG | TEMPERATURE: 98.7 F | HEART RATE: 87 BPM | DIASTOLIC BLOOD PRESSURE: 65 MMHG

## 2021-09-10 DIAGNOSIS — K21.00 GASTROESOPHAGEAL REFLUX DISEASE WITH ESOPHAGITIS WITHOUT HEMORRHAGE: ICD-10-CM

## 2021-09-10 DIAGNOSIS — K80.20 CALCULUS OF GALLBLADDER WITHOUT CHOLECYSTITIS WITHOUT OBSTRUCTION: ICD-10-CM

## 2021-09-10 DIAGNOSIS — Z00.00 ROUTINE GENERAL MEDICAL EXAMINATION AT A HEALTH CARE FACILITY: Primary | ICD-10-CM

## 2021-09-10 DIAGNOSIS — Z82.79 FAMILY HISTORY OF BICUSPID AORTIC VALVE: ICD-10-CM

## 2021-09-10 DIAGNOSIS — M25.561 ACUTE PAIN OF RIGHT KNEE: ICD-10-CM

## 2021-09-10 PROCEDURE — 99395 PREV VISIT EST AGE 18-39: CPT | Mod: 25 | Performed by: FAMILY MEDICINE

## 2021-09-10 PROCEDURE — 99213 OFFICE O/P EST LOW 20 MIN: CPT | Mod: 25 | Performed by: FAMILY MEDICINE

## 2021-09-10 PROCEDURE — 90471 IMMUNIZATION ADMIN: CPT | Performed by: FAMILY MEDICINE

## 2021-09-10 PROCEDURE — 90686 IIV4 VACC NO PRSV 0.5 ML IM: CPT | Performed by: FAMILY MEDICINE

## 2021-09-10 ASSESSMENT — ENCOUNTER SYMPTOMS
DIZZINESS: 0
CHILLS: 0
SHORTNESS OF BREATH: 0
DIARRHEA: 0
FEVER: 0
EYE PAIN: 0
PARESTHESIAS: 0
CONSTIPATION: 0
DYSURIA: 0
NAUSEA: 1
NERVOUS/ANXIOUS: 0
HEMATOCHEZIA: 0
BREAST MASS: 0
HEMATURIA: 0
ABDOMINAL PAIN: 0
JOINT SWELLING: 0
FREQUENCY: 0
HEADACHES: 0
ARTHRALGIAS: 1
HEARTBURN: 0
SORE THROAT: 0
PALPITATIONS: 0
COUGH: 0
WEAKNESS: 0
MYALGIAS: 0

## 2021-09-10 NOTE — PROGRESS NOTES
SUBJECTIVE:   CC: Sailaja Mcneal is an 25 year old woman who presents for preventive health visit.     Patient has been advised of split billing requirements and indicates understanding: Yes  Healthy Habits:     Getting at least 3 servings of Calcium per day:  NO    Bi-annual eye exam:  Yes    Dental care twice a year:  NO    Sleep apnea or symptoms of sleep apnea:  None    Diet:  Regular (no restrictions)    Frequency of exercise:  None    Taking medications regularly:  Yes    Medication side effects:  None    PHQ-2 Total Score: 0    Additional concerns today:  No    - Right knee has been hurting intermittently for about a month.  Mostly bothers her with squatting or sitting cross legged.  No swelling or redness.  No injuries.  Ok with walking and stairs.    - Patient has been taking Pepcid for GERD.  She also saw a surgeon to discuss surgery and they decided to postpone because she wasn't very symptomatic.  Has made diet changes and has noticed much less symptoms now.     - Her son was diagnosed with a bicuspid aortic valve and narrow aorta.  Mom was told that it could be genetic and is wondering about getting checked out for this.        Today's PHQ-2 Score:   PHQ-2 ( 1999 Pfizer) 9/7/2021   Q1: Little interest or pleasure in doing things 0   Q2: Feeling down, depressed or hopeless 0   PHQ-2 Score 0   Q1: Little interest or pleasure in doing things Not at all   Q2: Feeling down, depressed or hopeless Not at all   PHQ-2 Score 0     Abuse: Current or Past (Physical, Sexual or Emotional) - No  Do you feel safe in your environment? Yes    Social History     Tobacco Use     Smoking status: Never Smoker     Smokeless tobacco: Never Used   Substance Use Topics     Alcohol use: Yes     Comment: Special occasions only.     Alcohol Use 9/7/2021   Prescreen: >3 drinks/day or >7 drinks/week? No     Reviewed orders with patient.  Reviewed health maintenance and updated orders accordingly - Yes  Patient Active Problem List    Diagnosis     Iron deficiency anemia     Gastroesophageal reflux disease with esophagitis without hemorrhage     Calculus of gallbladder without cholecystitis without obstruction     Past Surgical History:   Procedure Laterality Date     NO HISTORY OF SURGERY         Social History     Tobacco Use     Smoking status: Never Smoker     Smokeless tobacco: Never Used   Substance Use Topics     Alcohol use: Yes     Comment: Special occasions only.     Family History   Problem Relation Age of Onset     Cholelithiasis Sister            History of abnormal Pap smear: NO - age 21-29 PAP every 3 years recommended  PAP / HPV 1/11/2021   PAP (Historical) NIL     Review of Systems   Constitutional: Negative for chills and fever.   HENT: Negative for congestion, ear pain, hearing loss and sore throat.    Eyes: Positive for visual disturbance. Negative for pain.   Respiratory: Negative for cough and shortness of breath.    Cardiovascular: Negative for chest pain, palpitations and peripheral edema.   Gastrointestinal: Positive for nausea. Negative for abdominal pain, constipation, diarrhea, heartburn and hematochezia.   Breasts:  Negative for tenderness, breast mass and discharge.   Genitourinary: Positive for vaginal discharge. Negative for dysuria, frequency, genital sores, hematuria, pelvic pain, urgency and vaginal bleeding.   Musculoskeletal: Positive for arthralgias. Negative for joint swelling and myalgias.   Skin: Negative for rash.   Neurological: Negative for dizziness, weakness, headaches and paresthesias.   Psychiatric/Behavioral: Negative for mood changes. The patient is not nervous/anxious.      OBJECTIVE:   /65 (BP Location: Right arm, Patient Position: Sitting)   Pulse 87   Temp 98.7  F (37.1  C) (Oral)   Wt 54.1 kg (119 lb 3.2 oz)   BMI 24.91 kg/m    Physical Exam  GENERAL: healthy, alert and no distress  EYES: Eyes grossly normal to inspection, PERRL and conjunctivae and sclerae normal  HENT: ear  "canals and TM's normal, nose and mouth without ulcers or lesions  NECK: no adenopathy, no asymmetry, masses, or scars and thyroid normal to palpation  RESP: lungs clear to auscultation - no rales, rhonchi or wheezes  BREAST: normal without masses, tenderness or nipple discharge and no palpable axillary masses or adenopathy  CV: regular rate and rhythm, normal S1 S2, no S3 or S4, no murmur, click or rub, no peripheral edema and peripheral pulses strong  ABDOMEN: soft, nontender, no hepatosplenomegaly, no masses and bowel sounds normal  MS: no gross musculoskeletal defects noted, no edema.  Right knee with mild swelling posteriorly.  No redness or warmth.  Normal ROM with no pain.  No TTP.  Ligaments feel intact.   SKIN: no suspicious lesions or rashes  NEURO: Normal strength and tone, mentation intact and speech normal  PSYCH: mentation appears normal, affect normal/bright    ASSESSMENT/PLAN:       ICD-10-CM    1. Routine general medical examination at a health care facility  Z00.00    2. Acute pain of right knee  M25.561    3. Gastroesophageal reflux disease with esophagitis without hemorrhage  K21.00    4. Calculus of gallbladder without cholecystitis without obstruction  K80.20    5. Family history of bicuspid aortic valve  Z82.79 Echocardiogram Complete     - Knee pain is most likely a mild patellofemoral issue related to carrying her son around.  Advised RICE therapy.  - GERD and gallstones symptoms improved so will monitor for now  - Will obtain an echo given that her son had bicuspid aortic valve and cardiology recommended family screening     Patient has been advised of split billing requirements and indicates understanding: Yes  COUNSELING:  Reviewed preventive health counseling, as reflected in patient instructions    Estimated body mass index is 24.91 kg/m  as calculated from the following:    Height as of 1/29/21: 1.473 m (4' 10\").    Weight as of this encounter: 54.1 kg (119 lb 3.2 oz).        She " reports that she has never smoked. She has never used smokeless tobacco.      Counseling Resources:  ATP IV Guidelines  Pooled Cohorts Equation Calculator  Breast Cancer Risk Calculator  BRCA-Related Cancer Risk Assessment: FHS-7 Tool  FRAX Risk Assessment  ICSI Preventive Guidelines  Dietary Guidelines for Americans, 2010  USDA's MyPlate  ASA Prophylaxis  Lung CA Screening    DO STEPHANIE Barnes [facility]

## 2022-02-15 ENCOUNTER — OFFICE VISIT (OUTPATIENT)
Dept: SURGERY | Facility: CLINIC | Age: 27
End: 2022-02-15
Payer: COMMERCIAL

## 2022-02-15 VITALS
SYSTOLIC BLOOD PRESSURE: 117 MMHG | DIASTOLIC BLOOD PRESSURE: 79 MMHG | BODY MASS INDEX: 25.08 KG/M2 | WEIGHT: 120 LBS | HEART RATE: 121 BPM

## 2022-02-15 DIAGNOSIS — R10.13 ABDOMINAL PAIN, EPIGASTRIC: Primary | ICD-10-CM

## 2022-02-15 PROCEDURE — 99213 OFFICE O/P EST LOW 20 MIN: CPT | Performed by: SURGERY

## 2022-02-15 NOTE — LETTER
2/15/2022         RE: Sailaja Mcneal  [address]  [place] [address]        Dear Colleague,    Thank you for referring your patient, Sailaja Mcneal, to the [facility]. Please see a copy of my visit note below.    Chief complaint:  Epigastric pain     HPI:  This patient is a 26 year old female who saw me about her epigastric pain. At that time she presented with intermittent epigastric pain for about three weeks. She has been having heartburn since she started birth control.  The pain is not associated with eating fatty foods.  Negative for associated symptoms of nausea, vomiting, fever and chills. She does not have a history of jaundice.  She  has not had pancreatitis in the past.  Pain is better with heart burn medicine, she forgot to take it last week and the heartburn returned as did the abdominal pain. This resolved once she resumed the famotidine. She feels like she has two different pains. One epigastric and one RUQ.     Past Medical History:   has a past medical history of No pertinent past medical history.    Past Surgical History:  Past Surgical History:   Procedure Laterality Date     NO HISTORY OF SURGERY       Social History:  Social History     Socioeconomic History     Marital status: Single     Spouse name: Not on file     Number of children: Not on file     Years of education: Not on file     Highest education level: Not on file   Occupational History     Not on file   Tobacco Use     Smoking status: Never Smoker     Smokeless tobacco: Never Used   Substance and Sexual Activity     Alcohol use: Yes     Comment: Special occasions only.     Drug use: Never     Sexual activity: Yes     Partners: Male     Birth control/protection: Pill   Other Topics Concern     Parent/sibling w/ CABG, MI or angioplasty before 65F 55M? No   Social History Narrative     Not on file     Social Determinants of Health     Financial Resource Strain: Not on file   Food Insecurity: Not on file    Transportation Needs: Not on file   Physical Activity: Not on file   Stress: Not on file   Social Connections: Not on file   Intimate Partner Violence: Not on file   Housing Stability: Not on file      Family History:  Family History   Problem Relation Age of Onset     Cholelithiasis Sister      Review of Systems:  The 10 point review of systems is negative other than noted in the HPI and above.    Physical Exam:  /79   Pulse (!) 121   Wt 54.4 kg (120 lb)   BMI 25.08 kg/m      Constitutional: healthy, alert and no distress  Eyes: Pupils round and equal, no icterus   ENT: Mucous membranes moist  Respiratory:  Non-labored respiration  Gastrointestinal: Abdomen soft, tender RUQ. No masses, organomegaly  Musculoskeletal: No gross deformity  Neurologic: No gross focal deficits  Psychiatric: mentation appears normal and affect normal/bright  Hematologic/Lymphatic/Immunologic: No bruising noted  Skin: No lesions, rashes, erythema or jaundice noted    Assessment and Plan:  We discussed doing lap lissy verses doing an EGD and the risks and benefits of each and the risks and benefits of timing were discussed.     We have discussed the indication, alternatives, risks and expected recovery.  Specifically we have discussed incisions, scarring, postoperative infections, anesthesia, bleeding, blood transfusion, open conversion, common bile duct injury, injury to intra-abdominal organs, adhesions that can lead to bowel obstruction, retained common bile duct stone, bile leak, DVT, PE, hernia, post cholecystectomy diarrhea, postoperative dietary restrictions and physical limitations.  We have discussed the recommended interventions and treatments for these complications.  All questions have been answered to the best of my ability.         Upper endoscopy then cholecystectomy if upper endoscopy fine      Richard Benitez, DO          Again, thank you for allowing me to participate in the care of your patient.         Sincerely,        Richard Benitez, DO

## 2022-02-15 NOTE — PROGRESS NOTES
Chief complaint:  Epigastric pain     HPI:  This patient is a 26 year old female who saw me about her epigastric pain. At that time she presented with intermittent epigastric pain for about three weeks. She has been having heartburn since she started birth control.  The pain is not associated with eating fatty foods.  Negative for associated symptoms of nausea, vomiting, fever and chills. She does not have a history of jaundice.  She  has not had pancreatitis in the past.  Pain is better with heart burn medicine, she forgot to take it last week and the heartburn returned as did the abdominal pain. This resolved once she resumed the famotidine. She feels like she has two different pains. One epigastric and one RUQ.     Past Medical History:   has a past medical history of No pertinent past medical history.    Past Surgical History:  Past Surgical History:   Procedure Laterality Date     NO HISTORY OF SURGERY       Social History:  Social History     Socioeconomic History     Marital status: Single     Spouse name: Not on file     Number of children: Not on file     Years of education: Not on file     Highest education level: Not on file   Occupational History     Not on file   Tobacco Use     Smoking status: Never Smoker     Smokeless tobacco: Never Used   Substance and Sexual Activity     Alcohol use: Yes     Comment: Special occasions only.     Drug use: Never     Sexual activity: Yes     Partners: Male     Birth control/protection: Pill   Other Topics Concern     Parent/sibling w/ CABG, MI or angioplasty before 65F 55M? No   Social History Narrative     Not on file     Social Determinants of Health     Financial Resource Strain: Not on file   Food Insecurity: Not on file   Transportation Needs: Not on file   Physical Activity: Not on file   Stress: Not on file   Social Connections: Not on file   Intimate Partner Violence: Not on file   Housing Stability: Not on file      Family History:  Family History   Problem  Relation Age of Onset     Cholelithiasis Sister      Review of Systems:  The 10 point review of systems is negative other than noted in the HPI and above.    Physical Exam:  /79   Pulse (!) 121   Wt 54.4 kg (120 lb)   BMI 25.08 kg/m      Constitutional: healthy, alert and no distress  Eyes: Pupils round and equal, no icterus   ENT: Mucous membranes moist  Respiratory:  Non-labored respiration  Gastrointestinal: Abdomen soft, tender RUQ. No masses, organomegaly  Musculoskeletal: No gross deformity  Neurologic: No gross focal deficits  Psychiatric: mentation appears normal and affect normal/bright  Hematologic/Lymphatic/Immunologic: No bruising noted  Skin: No lesions, rashes, erythema or jaundice noted    Assessment and Plan:  We discussed doing lap lissy verses doing an EGD and the risks and benefits of each and the risks and benefits of timing were discussed.     We have discussed the indication, alternatives, risks and expected recovery.  Specifically we have discussed incisions, scarring, postoperative infections, anesthesia, bleeding, blood transfusion, open conversion, common bile duct injury, injury to intra-abdominal organs, adhesions that can lead to bowel obstruction, retained common bile duct stone, bile leak, DVT, PE, hernia, post cholecystectomy diarrhea, postoperative dietary restrictions and physical limitations.  We have discussed the recommended interventions and treatments for these complications.  All questions have been answered to the best of my ability.         Upper endoscopy then cholecystectomy if upper endoscopy bambi Benitez, DO

## 2022-02-16 ENCOUNTER — TELEPHONE (OUTPATIENT)
Dept: GASTROENTEROLOGY | Facility: CLINIC | Age: 27
End: 2022-02-16
Payer: COMMERCIAL

## 2022-02-16 DIAGNOSIS — Z11.59 ENCOUNTER FOR SCREENING FOR OTHER VIRAL DISEASES: Primary | ICD-10-CM

## 2022-02-16 NOTE — TELEPHONE ENCOUNTER
Screening Questions  Blue=prep questions Red=location Green=sedation   1. Are you active on mychart? Y    2. What insurance is in the chart? [employer]     3.  Ordering/Referring Provider: Emmanuel    4. BMI 24.2 , If greater than 40 review exclusion criteria also will need EXTENDED PREP    5.  Respiratory Screening (If yes to any of the following HOSPITAL setting only):     Do you use daily home oxygen? N  Do you have mod to severe Obstructive Sleep Apnea? N (can be seen at [facility] or hospital setting)    Do you have Pulmonary Hypertension? N   Do you have UNCONTROLLED asthma? N    6. Have you had a heart or lung transplant? N  (If yes, please review exclusion criteria)    7. Are you currently on dialysis?N  (If yes, schedule in HOSPITAL setting only)(If yes, please send Golytely prep)    8. Do you have chronic kidney disease? N (If yes, please send Golytely prep)    9. Have you had a stroke or Transient ischemic attack (TIA) within 6 months? N (If yes, do not schedule at [facility])    10. In the past 6 months, have you had any heart related issues including cardiomyopathy or heart attack? N (If yes, please review exclusion criteria)           If yes, did it require cardiac stenting or other implantable device?N  (If yes, please review exclusion criteria)      11. Do you have any implantable devices in your body (pacemaker, defib, LVAD)? N (If yes, schedule at [facility])    12. Do you take nitroglycerin? If yes, how often? N (if yes, schedule at HOSPITAL setting)    13. Are you currently taking any blood thinners?N (If yes- inform patient to follow up with PCP or provider for follow up instructions)     14. Are you a diabetic? N (If yes, please send Golytely prep)    15. (Females) Are you currently pregnant? N  If yes, how many weeks?      16. Are you taking any prescription pain medications on a routine schedule? N If yes, MAC sedation and patient will need EXTENDED PREP.    17. Do you have any chemical dependencies such as alcohol,  street drugs, or methadone? N If yes, MAC sedation     18. Do you have any history of post-traumatic stress syndrome, severe anxiety or history of psychosis? N  If yes, MAC sedation.     19. Do you transfer independently? Y    20.  Do you have any issues with constipation? N   If yes, pt will need EXTENDED PREP     21. Preferred Pharmacy for Pre Prescription     Scheduling Details    Which Colonoscopy Prep was Sent?:   Type of Procedure Scheduled: EGD  Surgeon: Emmanuel  Date of Procedure: 3-16  Location:   Caller (Please ask for phone number if not scheduled by patient): [facility]      Sedation Type: CS  Conscious Sedation- Needs  for 6 hours after the procedure  MAC/General-Needs  for 24 hours after procedure    Pre-op Required at [facility] [place], [place] and OR for MAC sedation: Y  (if yes advise patient they will need a pre-op prior to procedure)      Informed patient they will need an adult  Y  Cannot take any type of public or medical transportation alone    Pre-Procedure Covid test to be completed at [facility] or Externally: Y NE lab 3-14     Confirmed Nurse will call to complete assessment Y    Additional comments:  ([facility] PATIENTS NEED EXTENDED PREP)

## 2022-03-14 ENCOUNTER — LAB (OUTPATIENT)
Dept: LAB | Facility: CLINIC | Age: 27
End: 2022-03-14
Payer: COMMERCIAL

## 2022-03-14 DIAGNOSIS — Z11.59 ENCOUNTER FOR SCREENING FOR OTHER VIRAL DISEASES: ICD-10-CM

## 2022-03-14 PROCEDURE — U0003 INFECTIOUS AGENT DETECTION BY NUCLEIC ACID (DNA OR RNA); SEVERE ACUTE RESPIRATORY SYNDROME CORONAVIRUS 2 (SARS-COV-2) (CORONAVIRUS DISEASE [COVID-19]), AMPLIFIED PROBE TECHNIQUE, MAKING USE OF HIGH THROUGHPUT TECHNOLOGIES AS DESCRIBED BY CMS-2020-01-R: HCPCS

## 2022-03-14 PROCEDURE — U0005 INFEC AGEN DETEC AMPLI PROBE: HCPCS

## 2022-03-15 LAB — SARS-COV-2 RNA RESP QL NAA+PROBE: NEGATIVE

## 2022-03-16 ENCOUNTER — HOSPITAL ENCOUNTER (OUTPATIENT)
Facility: AMBULATORY SURGERY CENTER | Age: 27
Discharge: HOME | End: 2022-03-16
Attending: SURGERY | Admitting: SURGERY
Payer: COMMERCIAL

## 2022-03-16 VITALS
OXYGEN SATURATION: 98 % | TEMPERATURE: 97 F | BODY MASS INDEX: 25.19 KG/M2 | HEART RATE: 102 BPM | RESPIRATION RATE: 18 BRPM | DIASTOLIC BLOOD PRESSURE: 72 MMHG | WEIGHT: 120 LBS | SYSTOLIC BLOOD PRESSURE: 101 MMHG | HEIGHT: 58 IN

## 2022-03-16 LAB — UPPER GI ENDOSCOPY: NORMAL

## 2022-03-16 PROCEDURE — G8918 PT W/O PREOP ORDER IV AB PRO: HCPCS

## 2022-03-16 PROCEDURE — 43239 EGD BIOPSY SINGLE/MULTIPLE: CPT | Performed by: SURGERY

## 2022-03-16 PROCEDURE — 88342 IMHCHEM/IMCYTCHM 1ST ANTB: CPT | Mod: GC | Performed by: PATHOLOGY

## 2022-03-16 PROCEDURE — G8907 PT DOC NO EVENTS ON DISCHARG: HCPCS

## 2022-03-16 PROCEDURE — 43239 EGD BIOPSY SINGLE/MULTIPLE: CPT

## 2022-03-16 PROCEDURE — 88305 TISSUE EXAM BY PATHOLOGIST: CPT | Mod: GC | Performed by: PATHOLOGY

## 2022-03-16 PROCEDURE — 99152 MOD SED SAME PHYS/QHP 5/>YRS: CPT | Mod: 59 | Performed by: SURGERY

## 2022-03-16 RX ORDER — FENTANYL CITRATE 50 UG/ML
INJECTION, SOLUTION INTRAMUSCULAR; INTRAVENOUS PRN
Status: DISCONTINUED | OUTPATIENT
Start: 2022-03-16 | End: 2022-03-16 | Stop reason: HOSPADM

## 2022-03-16 RX ORDER — ONDANSETRON 2 MG/ML
4 INJECTION INTRAMUSCULAR; INTRAVENOUS
Status: DISCONTINUED | OUTPATIENT
Start: 2022-03-16 | End: 2022-03-17 | Stop reason: HOSPADM

## 2022-03-16 RX ORDER — LIDOCAINE 40 MG/G
CREAM TOPICAL
Status: DISCONTINUED | OUTPATIENT
Start: 2022-03-16 | End: 2022-03-17 | Stop reason: HOSPADM

## 2022-03-16 NOTE — H&P
[facility] Anesthesia Pre-op History and Physical    Sailaja Mcneal MRN# [id number]   Age: 26 year old YOB: 1995      Date of Surgery: 3/16/2022     Date of Exam 3/16/2022                Chief Complaint and/or Reason for Procedure:   Conscious sedation         Active problem list:     Patient Active Problem List    Diagnosis Date Noted     Gastroesophageal reflux disease with esophagitis without hemorrhage 09/10/2021     Priority: Medium     Calculus of gallbladder without cholecystitis without obstruction 09/10/2021     Priority: Medium     Iron deficiency anemia 09/22/2020     Priority: Medium            Medications (include herbals and vitamins):      Current Outpatient Medications   Medication Sig     famotidine (PEPCID) 40 MG tablet Take 1 tablet (40 mg) by mouth 2 times daily     Multiple Vitamin (MULTIVITAMIN PO)      VIENVA 0.1-20 MG-MCG tablet TAKE 1 TABLET BY MOUTH EVERY DAY     Current Facility-Administered Medications   Medication     fentaNYL (PF) (SUBLIMAZE) injection     lidocaine (LMX4) kit     lidocaine 1 % 0.1-1 mL     midazolam (VERSED) injection     ondansetron (ZOFRAN) injection 4 mg     sodium chloride (PF) 0.9% PF flush 3 mL     sodium chloride (PF) 0.9% PF flush 3 mL     sodium chloride (PF) 0.9% PF flush             Allergies:    No Known Allergies            Physical Exam:   All vitals have been reviewed  Patient Vitals for the past 8 hrs:   BP Temp Temp src Pulse Resp SpO2   03/16/22 0835 110/86 -- -- 97 16 100 %   03/16/22 0822 112/82 97  F (36.1  C) Temporal 94 16 97 %     No intake/output data recorded.  Airway assessment:   Patient is able to open mouth wide  Patient is able to stick out tongue}      Lungs:   No increased work of breathing, good air exchange, clear to auscultation bilaterally, no crackles or wheezing     Cardiovascular:   regular rate and rhythm and normal S1 and S2             Anesthetic risk and/or ASA classification:     ASA classification  2    Richard Benitez, DO

## 2022-03-22 LAB
PATH REPORT.COMMENTS IMP SPEC: NORMAL
PATH REPORT.COMMENTS IMP SPEC: NORMAL
PATH REPORT.FINAL DX SPEC: NORMAL
PATH REPORT.GROSS SPEC: NORMAL
PATH REPORT.MICROSCOPIC SPEC OTHER STN: NORMAL
PATH REPORT.RELEVANT HX SPEC: NORMAL
PHOTO IMAGE: NORMAL

## 2022-05-05 ENCOUNTER — OFFICE VISIT (OUTPATIENT)
Dept: SURGERY | Facility: CLINIC | Age: 27
End: 2022-05-05
Payer: COMMERCIAL

## 2022-05-05 VITALS
WEIGHT: 120 LBS | HEART RATE: 87 BPM | BODY MASS INDEX: 25.09 KG/M2 | SYSTOLIC BLOOD PRESSURE: 116 MMHG | DIASTOLIC BLOOD PRESSURE: 78 MMHG

## 2022-05-05 DIAGNOSIS — A04.8 H. PYLORI INFECTION: Primary | ICD-10-CM

## 2022-05-05 PROCEDURE — 99213 OFFICE O/P EST LOW 20 MIN: CPT | Performed by: SURGERY

## 2022-05-05 NOTE — LETTER
5/5/2022         RE: Sailaja Mcneal  [address]  [place] [address]        Dear Colleague,    Thank you for referring your patient, Sailaja Mcneal, to the [facility]. Please see a copy of my visit note below.    General Surgery Post Op    Pt returns for follow up visit s/p EGD and treatment for H pylori .    Patient has been doing well, tolerating diet. Bowels moving well. Still having heart burn, meds help, Meds stopped 1 week ago    Physical exam: Vitals: /78   Pulse 87   Wt 54.4 kg (120 lb)   BMI 25.09 kg/m    BMI= Body mass index is 25.09 kg/m .    Exam:  Constitutional: healthy, alert and no distress  Eyes: Pupils round and equal, no icterus   ENT: Mucous membranes moist  Respiratory:  Non-labored respiration  Gastrointestinal: Abdomen soft, non-tender. No masses, organomegaly  Musculoskeletal: No gross deformity  Neurologic: No gross focal deficits  Psychiatric: mentation appears normal and affect normal/bright  Hematologic/Lymphatic/Immunologic: No bruising noted  Skin: No lesions, rashes, erythema or jaundice noted      Assessment: H pylori       Plan:  will test for eradication in 3 weeks      Richard Benitez DO            Again, thank you for allowing me to participate in the care of your patient.        Sincerely,        Richard Benitez DO

## 2022-05-05 NOTE — PROGRESS NOTES
General Surgery Post Op    Pt returns for follow up visit s/p EGD and treatment for H pylori .    Patient has been doing well, tolerating diet. Bowels moving well. Still having heart burn, meds help, Meds stopped 1 week ago    Physical exam: Vitals: /78   Pulse 87   Wt 54.4 kg (120 lb)   BMI 25.09 kg/m    BMI= Body mass index is 25.09 kg/m .    Exam:  Constitutional: healthy, alert and no distress  Eyes: Pupils round and equal, no icterus   ENT: Mucous membranes moist  Respiratory:  Non-labored respiration  Gastrointestinal: Abdomen soft, non-tender. No masses, organomegaly  Musculoskeletal: No gross deformity  Neurologic: No gross focal deficits  Psychiatric: mentation appears normal and affect normal/bright  Hematologic/Lymphatic/Immunologic: No bruising noted  Skin: No lesions, rashes, erythema or jaundice noted      Assessment: H pylori       Plan:  will test for eradication in 3 weeks      Richard Benitez, DO

## 2022-05-23 ENCOUNTER — LAB (OUTPATIENT)
Dept: LAB | Facility: CLINIC | Age: 27
End: 2022-05-23
Payer: COMMERCIAL

## 2022-05-23 DIAGNOSIS — A04.8 H. PYLORI INFECTION: ICD-10-CM

## 2022-05-23 PROCEDURE — 87338 HPYLORI STOOL AG IA: CPT

## 2022-05-25 LAB
Lab: NORMAL
PERFORMING LABORATORY: NORMAL
SPECIMEN STATUS: NORMAL
TEST NAME: NORMAL

## 2022-05-26 LAB — MISCELLANEOUS TEST 1 (ARUP): NORMAL

## 2022-05-27 ENCOUNTER — TELEPHONE (OUTPATIENT)
Dept: SURGERY | Facility: CLINIC | Age: 27
End: 2022-05-27
Payer: COMMERCIAL

## 2022-05-27 NOTE — TELEPHONE ENCOUNTER
Please review/advise lab results from 05/23/2022. Labs ordered under wrong provider. See providers message below.          ---- Message from VERENICE Johnson CNP sent at 5/27/2022  8:36 AM CDT -----  I did not order this test-appears she is seeing general surgery. Georgiana CALDERA CNP

## 2022-07-27 DIAGNOSIS — A04.8 H. PYLORI INFECTION: ICD-10-CM

## 2022-07-27 NOTE — TELEPHONE ENCOUNTER
Pending Prescriptions:                       Disp   Refills    omeprazole (PRILOSEC) 40 MG DR capsule    180 ca*0            Sig: Take 1 capsule (40 mg) by mouth 2 times daily

## 2022-07-28 RX ORDER — OMEPRAZOLE 40 MG/1
40 CAPSULE, DELAYED RELEASE ORAL 2 TIMES DAILY
Qty: 180 CAPSULE | Refills: 0 | Status: SHIPPED | OUTPATIENT
Start: 2022-07-28 | End: 2023-06-19

## 2022-07-28 NOTE — TELEPHONE ENCOUNTER
Pending Prescriptions:                       Disp   Refills    omeprazole (PRILOSEC) 40 MG DR capsule    180 ca*0            Sig: Take 1 capsule (40 mg) by mouth 2 times daily    RN refilled medication per [facility] Refill Protocol.       Ella SCHMIDT RN, [facility] 07/28/22 11:46 AM

## 2022-07-31 ENCOUNTER — MYC MEDICAL ADVICE (OUTPATIENT)
Dept: FAMILY MEDICINE | Facility: CLINIC | Age: 27
End: 2022-07-31

## 2022-08-01 NOTE — TELEPHONE ENCOUNTER
Routing to team. Please assist pt in scheduling a visit with any available provider as Dr. Henry is out of the office this week     Aylin Rader RN

## 2022-08-01 NOTE — TELEPHONE ENCOUNTER
Called patient and left a voicemail message - trying to schedule patient with any provider to be seen.     Sent a mychart message too.    SALOMON Romano  [facility]

## 2022-08-10 ENCOUNTER — TRANSFERRED RECORDS (OUTPATIENT)
Dept: HEALTH INFORMATION MANAGEMENT | Facility: CLINIC | Age: 27
End: 2022-08-10

## 2022-08-17 ENCOUNTER — TELEPHONE (OUTPATIENT)
Dept: FAMILY MEDICINE | Facility: CLINIC | Age: 27
End: 2022-08-17

## 2022-08-17 ENCOUNTER — OFFICE VISIT (OUTPATIENT)
Dept: FAMILY MEDICINE | Facility: CLINIC | Age: 27
End: 2022-08-17
Payer: COMMERCIAL

## 2022-08-17 VITALS
HEART RATE: 78 BPM | DIASTOLIC BLOOD PRESSURE: 74 MMHG | HEIGHT: 58 IN | OXYGEN SATURATION: 97 % | RESPIRATION RATE: 20 BRPM | SYSTOLIC BLOOD PRESSURE: 116 MMHG | TEMPERATURE: 98 F | WEIGHT: 124.2 LBS | BODY MASS INDEX: 26.07 KG/M2

## 2022-08-17 DIAGNOSIS — B37.31 YEAST INFECTION OF THE VAGINA: ICD-10-CM

## 2022-08-17 DIAGNOSIS — N89.8 VAGINAL ITCHING: Primary | ICD-10-CM

## 2022-08-17 LAB
CLUE CELLS: ABNORMAL
TRICHOMONAS, WET PREP: ABNORMAL
WBC'S/HIGH POWER FIELD, WET PREP: ABNORMAL
YEAST, WET PREP: PRESENT

## 2022-08-17 PROCEDURE — 87491 CHLMYD TRACH DNA AMP PROBE: CPT

## 2022-08-17 PROCEDURE — 87210 SMEAR WET MOUNT SALINE/INK: CPT

## 2022-08-17 PROCEDURE — 87591 N.GONORRHOEAE DNA AMP PROB: CPT

## 2022-08-17 PROCEDURE — 99214 OFFICE O/P EST MOD 30 MIN: CPT

## 2022-08-17 RX ORDER — CLOTRIMAZOLE 1 %
1 CREAM WITH APPLICATOR VAGINAL AT BEDTIME
Qty: 45 G | Refills: 0 | Status: SHIPPED | OUTPATIENT
Start: 2022-08-17 | End: 2022-08-17

## 2022-08-17 RX ORDER — CLOTRIMAZOLE 1 %
1 CREAM WITH APPLICATOR VAGINAL AT BEDTIME
Qty: 45 G | Refills: 0 | Status: SHIPPED | OUTPATIENT
Start: 2022-08-17 | End: 2023-06-19

## 2022-08-17 ASSESSMENT — PAIN SCALES - GENERAL: PAINLEVEL: NO PAIN (0)

## 2022-08-17 NOTE — PROGRESS NOTES
"  Assessment & Plan     Vaginal itching  Wet prep results + for yeast  - Wet prep - Clinic Collect  - Chlamydia trachomatis/Neisseria gonorrhoeae by PCR - Clinic Collect    Yeast infection of the vagina  Acute. Wet prep + for yeast. Patient is trying for pregnancy so avoiding PO treatment.   - clotrimazole (LOTRIMIN) 1 % vaginal cream; Place 1 Applicatorful vaginally At Bedtime for 7 days Can apply thin amount to perineal skin twice per day (AM and PM)             BMI:   Estimated body mass index is 25.96 kg/m  as calculated from the following:    Height as of this encounter: 1.473 m (4' 9.99\").    Weight as of this encounter: 56.3 kg (124 lb 3.2 oz).       See Patient Instructions    Return if symptoms worsen or fail to improve.    VERENICE Bunch CNP  [facility]    Jeremie Menard is a 26 year old accompanied by her son, presenting for the following health issues:  Vaginal Problem      Vaginal Problem     History of Present Illness       Reason for visit:  Vaginal itching  Symptom onset:  3-4 weeks ago  Symptoms include:  Sometimes irritation or when something that touches it starts itching.  Symptom intensity:  Mild  Symptom progression:  Improving  Had these symptoms before:  No    She eats 0-1 servings of fruits and vegetables daily.She consumes 1 sweetened beverage(s) daily.She exercises with enough effort to increase her heart rate 9 or less minutes per day.  She exercises with enough effort to increase her heart rate 3 or less days per week.   She is taking medications regularly.       Vaginal Symptoms  Onset/Duration: 3-4 weeks ago  Description:  Vaginal Discharge: white creamy   Itching (Pruritis): once a day  Burning sensation:  No  Odor: No   Accompanying Signs & Symptoms:  Urinary symptoms: YES, frequent urination  Abdominal pain: cramps and pains in the lower left and right abdomen   Fever: No  History:   Sexually active: YES  New Partner: No  Possibility of " Pregnancy:  Patient is trying to conceive  Recent antibiotic use: YES, due to root canal procedure  Previous vaginitis issues: YES  Precipitating or alleviating factors: pt tried to keep area dry, cold compress for itchiness  Therapies tried and outcome: none      No history of yeast infections.   Had BV twice, none in the past year.   No urinary symptoms.   + vaginal discharge - thin/white, not clumpy, normal for her - no changes.     Had intercourse last week, ovulated on 8/14/22, trying to get pregnant.    Was treated for Hpylori infection recently with triple therapy was on antibiotic for multiple weeks. Symptoms started within 1 week of stopping treatment. Also took antibiotics recently for dental concern.        Review of Systems   Genitourinary: Positive for vaginal discharge.      Constitutional, HEENT, cardiovascular, pulmonary, gi and gu systems are negative, except as otherwise noted.      Objective    There were no vitals taken for this visit.  There is no height or weight on file to calculate BMI.  Physical Exam   GENERAL: healthy, alert and no distress  NECK: no adenopathy, no asymmetry, masses, or scars and thyroid normal to palpation  RESP: lungs clear to auscultation - no rales, rhonchi or wheezes  CV: regular rate and rhythm, normal S1 S2, no S3 or S4, no murmur, click or rub, no peripheral edema and peripheral pulses strong  ABDOMEN: soft, nontender, no hepatosplenomegaly, no masses and bowel sounds normal   (female): POSITIVE for moderate amount of thin white discharge in vagina, no odor. No erythema, edema noted and otherwise normal female external genitalia, normal urethral meatus, vaginal mucosa.  MS: no gross musculoskeletal defects noted, no edema    Results for orders placed or performed in visit on 08/17/22 (from the past 24 hour(s))   Wet prep - Clinic Collect    Specimen: Vagina; Swab   Result Value Ref Range    Trichomonas Absent Absent    Yeast Present (A) Absent    Clue Cells Absent  Absent    WBCs/high power field 2+ (A) None                   .  ..

## 2022-08-17 NOTE — TELEPHONE ENCOUNTER
"Routing to Sidney Miranda- see message from pharmacy    Rx has two different directions for Lotrimin cream:    \"Place 1 Applicatorful vaginally At Bedtime for 7 days Can apply thin amount to perineal skin twice per day (AM and PM) - Vaginal\"    Rx pending for clarification.     Aylin Rader, RN    "

## 2022-08-17 NOTE — TELEPHONE ENCOUNTER
Tanisha faxed a Message to Prescriber re: clotrimazole (LOTRIMIN) 1 % vaginal cream    RX sent with 2 sets of directions, please verify which is correct.      Thank you!   Tanisha

## 2022-08-17 NOTE — PATIENT INSTRUCTIONS
Recommend intake of probiotics.  Increase hydration.  Start a prenatal.  Will send a prescription if any of the results today are positive.   Tide Free and Clear  Fragrance Free lotion - Cetaphil or Cerave

## 2022-08-18 LAB
C TRACH DNA SPEC QL PROBE+SIG AMP: NEGATIVE
N GONORRHOEA DNA SPEC QL NAA+PROBE: NEGATIVE

## 2022-08-23 ENCOUNTER — MYC MEDICAL ADVICE (OUTPATIENT)
Dept: FAMILY MEDICINE | Facility: CLINIC | Age: 27
End: 2022-08-23

## 2022-08-24 NOTE — TELEPHONE ENCOUNTER
Routing to provider prescribed : patient states that applicator is only half-full when using clotrimazole (LOTRIMIN) 1 % vaginal cream.     Would you like patient to extend duration of use? Or OK to discontinue if symptoms have cleared?     Idalia Branham, RN, BSN, PHN  [facility]: [place]    Per 8/17/22 OV:       Yeast infection of the vagina  Acute. Wet prep + for yeast. Patient is trying for pregnancy so avoiding PO treatment.   - clotrimazole (LOTRIMIN) 1 % vaginal cream; Place 1 Applicatorful vaginally At Bedtime for 7 days Can apply thin amount to perineal skin twice per day (AM and PM)

## 2022-08-24 NOTE — TELEPHONE ENCOUNTER
Yes, she can stop using the medication if her symptoms have cleared even if there is more left over in the tube. If symptoms do not resolve or return quickly after stopping she should come in for a follow up.

## 2022-09-23 ENCOUNTER — MYC MEDICAL ADVICE (OUTPATIENT)
Dept: FAMILY MEDICINE | Facility: CLINIC | Age: 27
End: 2022-09-23

## 2022-09-23 NOTE — TELEPHONE ENCOUNTER
RN replied to patient via [employer]. See message for details.     Enrrique Joshua RN, BSN, PHN  [facility]: [place]

## 2022-09-24 ENCOUNTER — HEALTH MAINTENANCE LETTER (OUTPATIENT)
Age: 27
End: 2022-09-24

## 2022-10-16 ENCOUNTER — MYC MEDICAL ADVICE (OUTPATIENT)
Dept: FAMILY MEDICINE | Facility: CLINIC | Age: 27
End: 2022-10-16

## 2022-10-17 ENCOUNTER — E-VISIT (OUTPATIENT)
Dept: FAMILY MEDICINE | Facility: CLINIC | Age: 27
End: 2022-10-17
Payer: COMMERCIAL

## 2022-10-17 DIAGNOSIS — N89.8 VAGINAL DISCHARGE: Primary | ICD-10-CM

## 2022-10-17 PROCEDURE — 99422 OL DIG E/M SVC 11-20 MIN: CPT

## 2022-10-17 NOTE — PATIENT INSTRUCTIONS
Thank you for choosing us for your care. Given your symptoms, I would like you to do a lab-only visit to determine what is causing them.  I have placed the orders.  Please schedule an appointment with the lab right here in [facility], or call [phone].  I will let you know when the results are back and next steps to take.  Please call to schedule lab only visit so we can test your urine and a vaginal swab so we can differentiate from a yeast infection vs bacterial vaginosis or other infection. I also ordered a pregnancy test based on your recent phone notes. Just let the lab know if you do not want that test (if you have gotten your period since the last note).     Sidney

## 2022-10-20 ENCOUNTER — LAB (OUTPATIENT)
Dept: LAB | Facility: CLINIC | Age: 27
End: 2022-10-20
Payer: COMMERCIAL

## 2022-10-20 DIAGNOSIS — N89.8 VAGINAL DISCHARGE: ICD-10-CM

## 2022-10-20 LAB
ALBUMIN UR-MCNC: NEGATIVE MG/DL
APPEARANCE UR: CLEAR
BACTERIA #/AREA URNS HPF: ABNORMAL /HPF
BILIRUB UR QL STRIP: NEGATIVE
CLUE CELLS: ABNORMAL
COLOR UR AUTO: YELLOW
GLUCOSE UR STRIP-MCNC: NEGATIVE MG/DL
HCG UR QL: NEGATIVE
HGB UR QL STRIP: ABNORMAL
KETONES UR STRIP-MCNC: NEGATIVE MG/DL
LEUKOCYTE ESTERASE UR QL STRIP: NEGATIVE
MUCOUS THREADS #/AREA URNS LPF: PRESENT /LPF
NITRATE UR QL: NEGATIVE
PH UR STRIP: 6 [PH] (ref 5–7)
RBC #/AREA URNS AUTO: ABNORMAL /HPF
SP GR UR STRIP: >=1.03 (ref 1–1.03)
SQUAMOUS #/AREA URNS AUTO: ABNORMAL /LPF
TRICHOMONAS, WET PREP: ABNORMAL
UROBILINOGEN UR STRIP-ACNC: 0.2 E.U./DL
WBC #/AREA URNS AUTO: ABNORMAL /HPF
WBC'S/HIGH POWER FIELD, WET PREP: ABNORMAL
YEAST, WET PREP: ABNORMAL

## 2022-10-20 PROCEDURE — 81001 URINALYSIS AUTO W/SCOPE: CPT

## 2022-10-20 PROCEDURE — 81025 URINE PREGNANCY TEST: CPT

## 2022-10-20 PROCEDURE — 87210 SMEAR WET MOUNT SALINE/INK: CPT

## 2022-10-20 PROCEDURE — 87491 CHLMYD TRACH DNA AMP PROBE: CPT

## 2022-10-20 PROCEDURE — 87591 N.GONORRHOEAE DNA AMP PROB: CPT

## 2022-10-21 LAB
C TRACH DNA SPEC QL NAA+PROBE: NEGATIVE
N GONORRHOEA DNA SPEC QL NAA+PROBE: NEGATIVE

## 2022-10-27 ENCOUNTER — OFFICE VISIT (OUTPATIENT)
Dept: FAMILY MEDICINE | Facility: CLINIC | Age: 27
End: 2022-10-27
Payer: COMMERCIAL

## 2022-10-27 VITALS
DIASTOLIC BLOOD PRESSURE: 68 MMHG | SYSTOLIC BLOOD PRESSURE: 106 MMHG | OXYGEN SATURATION: 98 % | TEMPERATURE: 98.3 F | HEART RATE: 78 BPM

## 2022-10-27 DIAGNOSIS — B37.31 YEAST INFECTION OF THE VAGINA: ICD-10-CM

## 2022-10-27 DIAGNOSIS — K64.4 EXTERNAL HEMORRHOIDS: Primary | ICD-10-CM

## 2022-10-27 PROCEDURE — 99213 OFFICE O/P EST LOW 20 MIN: CPT

## 2022-10-27 ASSESSMENT — PAIN SCALES - GENERAL: PAINLEVEL: NO PAIN (0)

## 2022-10-27 NOTE — PROGRESS NOTES
"  Assessment & Plan     External hemorrhoids  Subacute. Worsening. Physical exam notable for small non-thrombosed external hemorrhoids. Patient has not tried anything except clotrimazole vaginal cream with her past yeast infection. We discussed home/OTC symptomatic treatment options, preventing constipation, and GI referral in the future if symptoms are significant/worsening and patient wishes to pursue banding or surgical tx options. Did advise that if she is planning pregnancy likely hemorrhoids will return and best to do any invasive procedure or treatment when no future pregnancies are planning.    Yeast infection of the vagina  Resolved since treatment with clotrimazole lotion.     Patient is trying to get pregnant - topical medications preferred.          BMI:   Estimated body mass index is 25.96 kg/m  as calculated from the following:    Height as of 8/17/22: 1.473 m (4' 9.99\").    Weight as of 8/17/22: 56.3 kg (124 lb 3.2 oz).       If no improvement or worsening.     No follow-ups on file.    VERENICE Bunch [facility]    Jeremie Menard is a 27 year old accompanied by her children, presenting for the following health issues:  Results      History of Present Illness       Reason for visit:  Followup test results    She eats 0-1 servings of fruits and vegetables daily.She consumes 1 sweetened beverage(s) daily.She exercises with enough effort to increase her heart rate 60 or more minutes per day.  She exercises with enough effort to increase her heart rate 3 or less days per week.   She is taking medications regularly.       Discuss previous results. HPI  Seen 8/17 for vaginal itching with wet prep + yeast, tx with clotrimazole vaginal cream - Patient used cream for yone day and then stopped due to period. - symptoms resolved.    10/17 e-visit for repeated vaginal itching. Labs were negative for BV, yeast, G/C, and urine showed no infection.     Current symptoms " include some vaginal itching but predominant symptom is anal itching. Denies blood in stool.   Endorses some very hard stools with some straining, has BM every other day usually. Has not tried any rectal suppositories, stool softeners, or creams. Denies diarrhea. Denies rectal or pelvic pain.     Currently no burning, no dysuria, no pain with intercourse, no changes in discharge.   Hasn't started probiotic consistently - got Culturelle for vaginal health.     Declined vaccines today.               Review of Systems   Constitutional, HEENT, cardiovascular, pulmonary, gi and gu systems are negative, except as otherwise noted.      Objective    /68 (BP Location: Left arm, Patient Position: Chair, Cuff Size: Adult Regular)   Pulse 78   Temp 98.3  F (36.8  C) (Oral)   LMP 10/22/2022 (Approximate)   SpO2 98%   There is no height or weight on file to calculate BMI.  Physical Exam   GENERAL: healthy, alert and no distress  NECK: no adenopathy, no asymmetry, masses, or scars and thyroid normal to palpation  RESP: lungs clear to auscultation - no rales, rhonchi or wheezes  CV: regular rate and rhythm, normal S1 S2, no S3 or S4, no murmur, click or rub, no peripheral edema and peripheral pulses strong  ABDOMEN: soft, nontender, no hepatosplenomegaly, no masses and bowel sounds normal  RECTAL (female): normal sphincter tone, no rectal masses and + for multiple small external non-thrmobosed hemorrhoids. + tenderness/itching with digital rectal exam.  MS: no gross musculoskeletal defects noted, no edema      Lab on 10/20/2022   Component Date Value Ref Range Status     Color Urine 10/20/2022 Yellow  Colorless, Straw, Light Yellow, Yellow Final     Appearance Urine 10/20/2022 Clear  Clear Final     Glucose Urine 10/20/2022 Negative  Negative mg/dL Final     Bilirubin Urine 10/20/2022 Negative  Negative Final     Ketones Urine 10/20/2022 Negative  Negative mg/dL Final     Specific Gravity Urine 10/20/2022 >=1.030  1.003 -  1.035 Final     Blood Urine 10/20/2022 Small (A)  Negative Final     pH Urine 10/20/2022 6.0  5.0 - 7.0 Final     Protein Albumin Urine 10/20/2022 Negative  Negative mg/dL Final     Urobilinogen Urine 10/20/2022 0.2  0.2, 1.0 E.U./dL Final     Nitrite Urine 10/20/2022 Negative  Negative Final     Leukocyte Esterase Urine 10/20/2022 Negative  Negative Final     hCG Urine Qualitative 10/20/2022 Negative  Negative Final    This test is for screening purposes.  Results should be interpreted along with the clinical picture.  Confirmation testing is available if warranted by ordering [id number], HCG Quantitative Pregnancy.     Neisseria gonorrhoeae 10/20/2022 Negative  Negative Final    Negative for N. gonorrhoeae rRNA by transcription mediated amplification. A negative result by transcription mediated amplification does not preclude the presence of C. trachomatis infection because results are dependent on proper and adequate collection, absence of inhibitors and sufficient rRNA to be detected.     Chlamydia trachomatis 10/20/2022 Negative  Negative Final    A negative result by transcription mediated amplification does not preclude the presence of C. trachomatis infection because results are dependent on proper and adequate collection, absence of inhibitors and sufficient rRNA to be detected.     Bacteria Urine 10/20/2022 Moderate (A)  None Seen /HPF Final     RBC Urine 10/20/2022 0-2  0-2 /HPF /HPF Final     WBC Urine 10/20/2022 0-5  0-5 /HPF /HPF Final     Squamous Epithelials Urine 10/20/2022 Moderate (A)  None Seen /LPF Final     Mucus Urine 10/20/2022 Present (A)  None Seen /LPF Final

## 2022-10-27 NOTE — Clinical Note
Sai Cobb, not sure about this billing because patient had an E-visit where I ordered labs but they were negative and I couldn't treat so I told her to be seen in person. In this case do I bill for this visit but not the prior e-visit since or both? Thanks!  Sidney

## 2022-10-27 NOTE — PATIENT INSTRUCTIONS
Hydrocortisone cream/ointment - don't use all the time, it can thin the tissue/skin. But use when really irritated.    Preparation H - with lidocaine, can try occasionally.     Miralax for constipation.

## 2023-01-02 ENCOUNTER — E-VISIT (OUTPATIENT)
Dept: FAMILY MEDICINE | Facility: CLINIC | Age: 28
End: 2023-01-02
Payer: COMMERCIAL

## 2023-01-02 DIAGNOSIS — N94.9 VAGINAL DISCOMFORT: ICD-10-CM

## 2023-01-02 DIAGNOSIS — B37.31 YEAST INFECTION OF THE VAGINA: ICD-10-CM

## 2023-01-02 DIAGNOSIS — N89.8 VAGINAL DISCHARGE: ICD-10-CM

## 2023-01-02 PROCEDURE — 99421 OL DIG E/M SVC 5-10 MIN: CPT

## 2023-01-02 NOTE — PATIENT INSTRUCTIONS
Thank you for choosing us for your care. Given your symptoms, I would like you to do a lab-only visit to determine what is causing them.  I have placed the orders.  Please schedule an appointment with the lab right here in [facility], or call [phone].  I will let you know when the results are back and next steps to take.  Get labs drawn, we will treat accordingly if any are positive.

## 2023-01-06 ENCOUNTER — LAB (OUTPATIENT)
Dept: LAB | Facility: CLINIC | Age: 28
End: 2023-01-06
Payer: COMMERCIAL

## 2023-01-06 DIAGNOSIS — N89.8 VAGINAL DISCHARGE: ICD-10-CM

## 2023-01-06 DIAGNOSIS — N94.9 VAGINAL DISCOMFORT: ICD-10-CM

## 2023-01-06 LAB
ALBUMIN UR-MCNC: NEGATIVE MG/DL
APPEARANCE UR: CLEAR
BACTERIA #/AREA URNS HPF: ABNORMAL /HPF
BILIRUB UR QL STRIP: NEGATIVE
CLUE CELLS: ABNORMAL
COLOR UR AUTO: YELLOW
GLUCOSE UR STRIP-MCNC: NEGATIVE MG/DL
HCG UR QL: POSITIVE
HGB UR QL STRIP: ABNORMAL
KETONES UR STRIP-MCNC: NEGATIVE MG/DL
LEUKOCYTE ESTERASE UR QL STRIP: NEGATIVE
NITRATE UR QL: NEGATIVE
PH UR STRIP: 5.5 [PH] (ref 5–7)
RBC #/AREA URNS AUTO: ABNORMAL /HPF
SP GR UR STRIP: >=1.03 (ref 1–1.03)
SQUAMOUS #/AREA URNS AUTO: ABNORMAL /LPF
TRICHOMONAS, WET PREP: ABNORMAL
UROBILINOGEN UR STRIP-ACNC: 0.2 E.U./DL
WBC #/AREA URNS AUTO: ABNORMAL /HPF
WBC'S/HIGH POWER FIELD, WET PREP: ABNORMAL
YEAST, WET PREP: ABNORMAL

## 2023-01-06 PROCEDURE — 87210 SMEAR WET MOUNT SALINE/INK: CPT

## 2023-01-06 PROCEDURE — 81025 URINE PREGNANCY TEST: CPT

## 2023-01-06 PROCEDURE — 81001 URINALYSIS AUTO W/SCOPE: CPT

## 2023-01-12 ENCOUNTER — OFFICE VISIT (OUTPATIENT)
Dept: OBGYN | Facility: CLINIC | Age: 28
End: 2023-01-12
Payer: COMMERCIAL

## 2023-01-12 VITALS
HEIGHT: 59 IN | OXYGEN SATURATION: 97 % | BODY MASS INDEX: 23.55 KG/M2 | SYSTOLIC BLOOD PRESSURE: 114 MMHG | HEART RATE: 89 BPM | DIASTOLIC BLOOD PRESSURE: 78 MMHG | WEIGHT: 116.8 LBS

## 2023-01-12 DIAGNOSIS — O20.0 THREATENED MISCARRIAGE: Primary | ICD-10-CM

## 2023-01-12 PROCEDURE — 99213 OFFICE O/P EST LOW 20 MIN: CPT | Performed by: ADVANCED PRACTICE MIDWIFE

## 2023-01-12 PROCEDURE — 84702 CHORIONIC GONADOTROPIN TEST: CPT | Performed by: ADVANCED PRACTICE MIDWIFE

## 2023-01-12 PROCEDURE — 36415 COLL VENOUS BLD VENIPUNCTURE: CPT | Performed by: ADVANCED PRACTICE MIDWIFE

## 2023-01-12 NOTE — PROGRESS NOTES
S:  Sailaja is here due to bleeding in early pregnancy.  She noticed a small amount of pink with wiping yesterday.  Today there is more bleeding, but noting like a period. She is using a panty liner.  She has a small amount of cramping.  Per her LMP, she is 6w5d pregnant.    O:  She looks well, serious, concerned.  A/P  (O20.0) Threatened miscarriage  (primary encounter diagnosis)  Comment: Discussed ultrasound and serial hCG testing  Plan: hCG Quantitative Pregnancy, hCG Quantitative         Pregnancy ordered for today to be repeated Saturday or Sunday - she can make a lab only appt on Saturday or Sunday.  We discussed that we would review the results to see if the number is increasing or decreasing.  We discussed miscarriage precautions. Go to ED for heavy bleeding and/or severe pain.    Consider OB US depending on blood rest results and bleeding.    She is planning to go to [facility] for care.  Care should be managed by [facility] providers.

## 2023-01-13 ENCOUNTER — MYC MEDICAL ADVICE (OUTPATIENT)
Dept: OBGYN | Facility: CLINIC | Age: 28
End: 2023-01-13

## 2023-01-13 LAB — B-HCG SERPL-ACNC: 20 IU/L (ref 0–5)

## 2023-01-13 NOTE — TELEPHONE ENCOUNTER
Pt was seen by Phyllis Cortes CNM, yesterday, 1/12, for bleeding in early pregnancy.  Serial HCG quants were ordered for pt.  She had one yesterday and another one scheduled for Saturday.    Pt sent in a picture of a blood clot on TP and asked if she was miscarrying.

## 2023-01-14 ENCOUNTER — LAB (OUTPATIENT)
Dept: LAB | Facility: CLINIC | Age: 28
End: 2023-01-14
Payer: COMMERCIAL

## 2023-01-14 DIAGNOSIS — O20.0 THREATENED MISCARRIAGE: ICD-10-CM

## 2023-01-14 PROCEDURE — 84702 CHORIONIC GONADOTROPIN TEST: CPT

## 2023-01-14 PROCEDURE — 36415 COLL VENOUS BLD VENIPUNCTURE: CPT

## 2023-01-16 LAB — B-HCG SERPL-ACNC: 7 IU/L (ref 0–5)

## 2023-01-29 ENCOUNTER — HEALTH MAINTENANCE LETTER (OUTPATIENT)
Age: 28
End: 2023-01-29

## 2023-06-19 ENCOUNTER — PRENATAL OFFICE VISIT (OUTPATIENT)
Dept: NURSING | Facility: CLINIC | Age: 28
End: 2023-06-19
Payer: COMMERCIAL

## 2023-06-19 VITALS — HEIGHT: 59 IN | BODY MASS INDEX: 23.59 KG/M2

## 2023-06-19 DIAGNOSIS — Z34.90 ENCOUNTER FOR SUPERVISION OF NORMAL PREGNANCY: Primary | ICD-10-CM

## 2023-06-19 DIAGNOSIS — Z23 NEED FOR TDAP VACCINATION: ICD-10-CM

## 2023-06-19 PROCEDURE — 99207 PR NO CHARGE NURSE ONLY: CPT

## 2023-06-19 NOTE — PROGRESS NOTES
Important Information for Provider:     New ob nurse intake by phone, third pregnancy. Recent pregnancy 12/08/2020. Denies any problems with her pregnancies. Patient started bleeding end of last week into the weekend, scheduled ultrasound 6/20/2023 with CNM to call with results  Handouts reviewed. Declined genetic screening. NOB with CNM 7/07/2023      Prenatal OB Questionnaire  Patient supplied answers from flow sheet for:  Prenatal OB Questionnaire.  Past Medical History  Have you ever recieved care for your mental health? : (P) No  Have you ever been in a major accident or suffered serious trauma?: (P) No  Within the last year, has anyone hit, slapped, kicked or otherwise hurt you?: (P) No  In the last year, has anyone forced you to have sex when you didn't want to?: (P) No    Past Medical History 2   Have you ever received a blood transfusion?: (P) No  Would you accept a blood transfusion if was medically recommended?: (P) Yes  Does anyone in your home smoke?: (P) No   Is your blood type Rh negative?: (P) No  Have you ever ?: (!) (P) Yes  Have you been hospitalized for a nonsurgical reason excluding normal delivery?: (P) No  Have you ever had an abnormal pap smear?: (P) No    Past Medical History (Continued)  Do you have a history of abnormalities of the uterus?: (P) No  Did your mother take ROSANNE or any other hormones when she was pregnant with you?: (P) Unknown  Do you have any other problems we have not asked about which you feel may be important to this pregnancy?: (P) No                     Allergies as of 6/19/2023:    Allergies as of 06/19/2023     (No Known Allergies)       Current medications are:  No current outpatient medications on file.         Early ultrasound screening tool:    Does patient have irregular periods?  No  Did patient use hormonal birth control in the three months prior to positive urine pregnancy test? No  Is the patient breastfeeding?  No  Is the patient 10 weeks or greater  at time of education visit?  No

## 2023-06-20 ENCOUNTER — ANCILLARY PROCEDURE (OUTPATIENT)
Dept: ULTRASOUND IMAGING | Facility: CLINIC | Age: 28
End: 2023-06-20
Attending: ADVANCED PRACTICE MIDWIFE
Payer: COMMERCIAL

## 2023-06-20 ENCOUNTER — VIRTUAL VISIT (OUTPATIENT)
Dept: MIDWIFE SERVICES | Facility: CLINIC | Age: 28
End: 2023-06-20
Payer: COMMERCIAL

## 2023-06-20 ENCOUNTER — LAB (OUTPATIENT)
Dept: LAB | Facility: CLINIC | Age: 28
End: 2023-06-20
Payer: COMMERCIAL

## 2023-06-20 DIAGNOSIS — Z34.90 ENCOUNTER FOR SUPERVISION OF NORMAL PREGNANCY: ICD-10-CM

## 2023-06-20 DIAGNOSIS — O20.0 THREATENED MISCARRIAGE IN EARLY PREGNANCY: Primary | ICD-10-CM

## 2023-06-20 DIAGNOSIS — O20.0 THREATENED MISCARRIAGE IN EARLY PREGNANCY: ICD-10-CM

## 2023-06-20 LAB — HCG INTACT+B SERPL-ACNC: 3559 MIU/ML

## 2023-06-20 PROCEDURE — 76801 OB US < 14 WKS SINGLE FETUS: CPT | Mod: TC | Performed by: RADIOLOGY

## 2023-06-20 PROCEDURE — 99441 PR PHYSICIAN TELEPHONE EVALUATION 5-10 MIN: CPT | Mod: 95 | Performed by: ADVANCED PRACTICE MIDWIFE

## 2023-06-20 PROCEDURE — 36415 COLL VENOUS BLD VENIPUNCTURE: CPT

## 2023-06-20 PROCEDURE — 84702 CHORIONIC GONADOTROPIN TEST: CPT

## 2023-06-20 PROCEDURE — 76817 TRANSVAGINAL US OBSTETRIC: CPT | Mod: TC | Performed by: RADIOLOGY

## 2023-06-20 ASSESSMENT — ANXIETY QUESTIONNAIRES
5. BEING SO RESTLESS THAT IT IS HARD TO SIT STILL: NOT AT ALL
2. NOT BEING ABLE TO STOP OR CONTROL WORRYING: SEVERAL DAYS
3. WORRYING TOO MUCH ABOUT DIFFERENT THINGS: SEVERAL DAYS
GAD7 TOTAL SCORE: 4
6. BECOMING EASILY ANNOYED OR IRRITABLE: SEVERAL DAYS
IF YOU CHECKED OFF ANY PROBLEMS ON THIS QUESTIONNAIRE, HOW DIFFICULT HAVE THESE PROBLEMS MADE IT FOR YOU TO DO YOUR WORK, TAKE CARE OF THINGS AT HOME, OR GET ALONG WITH OTHER PEOPLE: SOMEWHAT DIFFICULT
7. FEELING AFRAID AS IF SOMETHING AWFUL MIGHT HAPPEN: NOT AT ALL
GAD7 TOTAL SCORE: 4
1. FEELING NERVOUS, ANXIOUS, OR ON EDGE: SEVERAL DAYS

## 2023-06-20 ASSESSMENT — PATIENT HEALTH QUESTIONNAIRE - PHQ9
SUM OF ALL RESPONSES TO PHQ QUESTIONS 1-9: 7
5. POOR APPETITE OR OVEREATING: NOT AT ALL

## 2023-06-20 NOTE — PROGRESS NOTES
Telemedicine Visit: The patient's condition can be safely assessed and treated via synchronous audio and visual telemedicine encounter.      Reason for Telemedicine Visit: Patient has requested telehealth visit    Originating Site (Patient Location): Patient's home    Distant Location (provider location):  On-site    Consent:  The patient/guardian has verbally consented to: the potential risks and benefits of telemedicine versus in person care; bill my insurance or make self-payment for services provided; and responsibility for payment of non-covered services.     Mode of Communication:  Telephone    Telephone time: 8 minutes    As the provider I attest to compliance with applicable laws and regulations related to telemedicine.    S:  Sailaja Mcneal is a 27 year old  who presents today for telephone visit follow up viability scan. She has had bleeding this pregnancy. It started on Friday, was light, increased Saturday and , and decreased again Monday and today. She is only using a liner today and not filling it. She has had minimal cramping,  night and last night but only brief and mild. The bleeding is less than her miscarriage in January. Discussed ultrasound results are inconclusive at this time. Discussed two potential outcomes of normal pregnancy just dates are off by 1 week (LMP 7w5d versus ultrasound 5w6d) or miscarriage and pregnancy is not growing normally. Discussed follow up is getting beta HCG levels which she would like to do this week and follow up ultrasound in 10-14 days. Discussed we will call her with her beta HCG levels and see her in person or have a telephone visit follow up after her ultrasound to discuss the results. We discussed calling us if anything changes, increased bleeding, pain, fevers, etc. She is O Pos blood type so does not need Rhogam. She has no other questions or concerns at this time.      O:  No vitals   Patient is well, easy to chat with     A:  Threatened  miscarriage     P:  (O20.0) Threatened miscarriage in early pregnancy  (primary encounter diagnosis)  Comment: Will have on call midwife follow up with labs when the second one returns. Triage to help coordinate labs and follow up. Follow up with telephone or in person visit after ultrasound.   Plan: hCG Quantitative Pregnancy, US OB Transvaginal         Only    VERENICE Wood CNM

## 2023-06-21 LAB
ABO/RH(D): NORMAL
ANTIBODY SCREEN: NEGATIVE
SPECIMEN EXPIRATION DATE: NORMAL

## 2023-06-22 ENCOUNTER — LAB (OUTPATIENT)
Dept: LAB | Facility: CLINIC | Age: 28
End: 2023-06-22
Payer: COMMERCIAL

## 2023-06-22 DIAGNOSIS — Z34.90 ENCOUNTER FOR SUPERVISION OF NORMAL PREGNANCY: ICD-10-CM

## 2023-06-22 DIAGNOSIS — O20.0 THREATENED MISCARRIAGE IN EARLY PREGNANCY: ICD-10-CM

## 2023-06-22 LAB
ALBUMIN UR-MCNC: NEGATIVE MG/DL
APPEARANCE UR: ABNORMAL
BILIRUB UR QL STRIP: NEGATIVE
COLOR UR AUTO: YELLOW
ERYTHROCYTE [DISTWIDTH] IN BLOOD BY AUTOMATED COUNT: 12.9 % (ref 10–15)
GLUCOSE UR STRIP-MCNC: NEGATIVE MG/DL
HCT VFR BLD AUTO: 36.4 % (ref 35–47)
HGB BLD-MCNC: 12.1 G/DL (ref 11.7–15.7)
HGB UR QL STRIP: ABNORMAL
KETONES UR STRIP-MCNC: NEGATIVE MG/DL
LEUKOCYTE ESTERASE UR QL STRIP: ABNORMAL
MCH RBC QN AUTO: 28.7 PG (ref 26.5–33)
MCHC RBC AUTO-ENTMCNC: 33.2 G/DL (ref 31.5–36.5)
MCV RBC AUTO: 87 FL (ref 78–100)
NITRATE UR QL: NEGATIVE
PH UR STRIP: 6.5 [PH] (ref 5–7)
PLATELET # BLD AUTO: 260 10E3/UL (ref 150–450)
RBC # BLD AUTO: 4.21 10E6/UL (ref 3.8–5.2)
SP GR UR STRIP: 1.01 (ref 1–1.03)
UROBILINOGEN UR STRIP-ACNC: 0.2 E.U./DL
WBC # BLD AUTO: 7.8 10E3/UL (ref 4–11)

## 2023-06-22 PROCEDURE — 87389 HIV-1 AG W/HIV-1&-2 AB AG IA: CPT

## 2023-06-22 PROCEDURE — 86901 BLOOD TYPING SEROLOGIC RH(D): CPT

## 2023-06-22 PROCEDURE — 87340 HEPATITIS B SURFACE AG IA: CPT

## 2023-06-22 PROCEDURE — 86850 RBC ANTIBODY SCREEN: CPT

## 2023-06-22 PROCEDURE — 86780 TREPONEMA PALLIDUM: CPT

## 2023-06-22 PROCEDURE — 86762 RUBELLA ANTIBODY: CPT

## 2023-06-22 PROCEDURE — 86900 BLOOD TYPING SEROLOGIC ABO: CPT

## 2023-06-22 PROCEDURE — 81003 URINALYSIS AUTO W/O SCOPE: CPT

## 2023-06-22 PROCEDURE — 36415 COLL VENOUS BLD VENIPUNCTURE: CPT

## 2023-06-22 PROCEDURE — 86803 HEPATITIS C AB TEST: CPT

## 2023-06-22 PROCEDURE — 85027 COMPLETE CBC AUTOMATED: CPT

## 2023-06-22 PROCEDURE — 87086 URINE CULTURE/COLONY COUNT: CPT

## 2023-06-22 PROCEDURE — 84702 CHORIONIC GONADOTROPIN TEST: CPT

## 2023-06-23 LAB
HBV SURFACE AG SERPL QL IA: NONREACTIVE
HCG INTACT+B SERPL-ACNC: 3915 MIU/ML
HCV AB SERPL QL IA: NONREACTIVE
HIV 1+2 AB+HIV1 P24 AG SERPL QL IA: NONREACTIVE
RUBV IGG SERPL QL IA: 0.79 INDEX
RUBV IGG SERPL QL IA: NORMAL
T PALLIDUM AB SER QL: NONREACTIVE

## 2023-06-24 LAB — BACTERIA UR CULT: NO GROWTH

## 2023-06-26 ENCOUNTER — MYC MEDICAL ADVICE (OUTPATIENT)
Dept: MIDWIFE SERVICES | Facility: CLINIC | Age: 28
End: 2023-06-26
Payer: COMMERCIAL

## 2023-06-26 DIAGNOSIS — Z34.90 SUPERVISION OF NORMAL PREGNANCY: Primary | ICD-10-CM

## 2023-07-07 ENCOUNTER — ANCILLARY PROCEDURE (OUTPATIENT)
Dept: ULTRASOUND IMAGING | Facility: CLINIC | Age: 28
End: 2023-07-07
Payer: COMMERCIAL

## 2023-07-07 ENCOUNTER — PRENATAL OFFICE VISIT (OUTPATIENT)
Dept: MIDWIFE SERVICES | Facility: CLINIC | Age: 28
End: 2023-07-07
Payer: COMMERCIAL

## 2023-07-07 VITALS
WEIGHT: 118.9 LBS | SYSTOLIC BLOOD PRESSURE: 88 MMHG | DIASTOLIC BLOOD PRESSURE: 64 MMHG | BODY MASS INDEX: 24.01 KG/M2 | HEART RATE: 76 BPM

## 2023-07-07 DIAGNOSIS — Z34.90 SUPERVISION OF NORMAL PREGNANCY: ICD-10-CM

## 2023-07-07 DIAGNOSIS — O03.9 MISCARRIAGE: Primary | ICD-10-CM

## 2023-07-07 LAB — HCG INTACT+B SERPL-ACNC: ABNORMAL MIU/ML

## 2023-07-07 PROCEDURE — 36415 COLL VENOUS BLD VENIPUNCTURE: CPT | Performed by: ADVANCED PRACTICE MIDWIFE

## 2023-07-07 PROCEDURE — 76817 TRANSVAGINAL US OBSTETRIC: CPT | Performed by: OBSTETRICS & GYNECOLOGY

## 2023-07-07 PROCEDURE — 84702 CHORIONIC GONADOTROPIN TEST: CPT | Performed by: ADVANCED PRACTICE MIDWIFE

## 2023-07-07 PROCEDURE — 99213 OFFICE O/P EST LOW 20 MIN: CPT | Performed by: ADVANCED PRACTICE MIDWIFE

## 2023-07-07 NOTE — PROGRESS NOTES
S:  Sailaja Mcneal is a 27 year old  who presents today for follow up viability scan for threatened miscarriage. Preliminary results showed no pregnancy growth. Discussed that this is preliminary so cannot confirm for sure but likely they will report this as confirmed miscarriage which is fitting with her story so far. She has had bleeding throughout most of the pregnancy. No bleeding this week for the first time. No pain. Her beta HCG levels have been stable at around 3000. Discussed diagnosis of incomplete miscarriage and 3 steps she can choose from moving forward. Discussed expectant management, medication management, and surgery management. She knows for sure she does not want to do surgery. She is thinking about medication management but not ready to commit today. Discussed she can think about it and return anytime to get the medications started. Information sent via [employer] to review. Discussed genetic testing can only be done with [facility]. They are not wanting that. Discussed future fertility and timing of trying again. Discussed what to expect, where to go or call with concerns, and danger signs discussed. They have no other questions or concerns at this time. Discussed emotionally how she is doing and offered therapy if she felt like she needed it and discussed community support groups. At this time she feels she is coping appropriately and needs no further assistance from us.     O:  BP (!) 88/64   Pulse 76   Wt 53.9 kg (118 lb 14.4 oz)   LMP 2023   BMI 24.01 kg/m     Patient is well, appropriately tearful.    A:  Miscarriage     P:  (O03.9) Miscarriage  (primary encounter diagnosis)  Plan: hCG Quantitative Pregnancy  Sailaja plans to communicate with us via [employer] about how the miscarriage is going if she has the miscarriage on her own. She will let us know if she changes her mind on medication management. She plans to give it about a week and if nothing has happened she will reach out and  make an appointment for medication management.     VERENICE Wood CNM

## 2023-07-21 ENCOUNTER — OFFICE VISIT (OUTPATIENT)
Dept: MIDWIFE SERVICES | Facility: CLINIC | Age: 28
End: 2023-07-21
Payer: COMMERCIAL

## 2023-07-21 VITALS
BODY MASS INDEX: 23.89 KG/M2 | DIASTOLIC BLOOD PRESSURE: 75 MMHG | WEIGHT: 118.3 LBS | SYSTOLIC BLOOD PRESSURE: 105 MMHG | HEART RATE: 98 BPM | OXYGEN SATURATION: 100 %

## 2023-07-21 DIAGNOSIS — O03.9 MISCARRIAGE: Primary | ICD-10-CM

## 2023-07-21 PROCEDURE — 99213 OFFICE O/P EST LOW 20 MIN: CPT | Performed by: ADVANCED PRACTICE MIDWIFE

## 2023-07-21 PROCEDURE — S0190 MIFEPRISTONE, ORAL, 200 MG: HCPCS | Performed by: ADVANCED PRACTICE MIDWIFE

## 2023-07-21 RX ORDER — MISOPROSTOL 200 UG/1
800 TABLET ORAL ONCE
Qty: 4 TABLET | Refills: 0 | Status: SHIPPED | OUTPATIENT
Start: 2023-07-21 | End: 2023-07-21

## 2023-07-21 RX ORDER — MIFEPRISTONE 200 MG/1
200 TABLET ORAL ONCE
Status: COMPLETED | OUTPATIENT
Start: 2023-07-21 | End: 2023-07-21

## 2023-07-21 RX ADMIN — MIFEPRISTONE 200 MG: 200 TABLET ORAL at 10:08

## 2023-07-21 ASSESSMENT — ANXIETY QUESTIONNAIRES
2. NOT BEING ABLE TO STOP OR CONTROL WORRYING: SEVERAL DAYS
1. FEELING NERVOUS, ANXIOUS, OR ON EDGE: NOT AT ALL
7. FEELING AFRAID AS IF SOMETHING AWFUL MIGHT HAPPEN: SEVERAL DAYS
GAD7 TOTAL SCORE: 4
IF YOU CHECKED OFF ANY PROBLEMS ON THIS QUESTIONNAIRE, HOW DIFFICULT HAVE THESE PROBLEMS MADE IT FOR YOU TO DO YOUR WORK, TAKE CARE OF THINGS AT HOME, OR GET ALONG WITH OTHER PEOPLE: SOMEWHAT DIFFICULT
5. BEING SO RESTLESS THAT IT IS HARD TO SIT STILL: NOT AT ALL
3. WORRYING TOO MUCH ABOUT DIFFERENT THINGS: SEVERAL DAYS
GAD7 TOTAL SCORE: 4
6. BECOMING EASILY ANNOYED OR IRRITABLE: SEVERAL DAYS

## 2023-07-21 ASSESSMENT — PATIENT HEALTH QUESTIONNAIRE - PHQ9
SUM OF ALL RESPONSES TO PHQ QUESTIONS 1-9: 5
5. POOR APPETITE OR OVEREATING: NOT AT ALL

## 2023-07-21 NOTE — PROGRESS NOTES
S: Sailaja is here for medication for miscarriage management. She had an U/S confirming miscarriage on 7/7/23. She chose expectant management, but still has not had a significant amount of bleeding that would indicate that she has passed the POC. She has had some ongoing spotting, but not even filling a panty liner. Denies cramping.     O:/75 (BP Location: Left arm, Patient Position: Sitting, Cuff Size: Adult Regular)   Pulse 98   Wt 53.7 kg (118 lb 4.8 oz)   LMP 04/27/2023   SpO2 100%   BMI 23.89 kg/m    Exam:  Constitutional: healthy, alert and no distress  Psychiatric: mentation appears normal and affect normal/bright    Obstetrical Ultrasound Report  OB U/S  < 14 Weeks -  Transvaginal   [facility]  Referring Provider: VERENICE Wood CNM  Sonographer: Alejandra Plummer RDMS  Indication:  Viability check      Dating (mm/dd/yyyy):   LMP: 04/27/23                 EDC:  02/01/24  GA by LMP:         10w1d     Current Scan On:  7/7/2023             EDC:  N/A            GA by Current Scan: N/A  The calculation of the gestational age by current scan was based on CRL.     Anatomy Scan:  Islas gestation.     Biometry:  CRL?                     1.3 mm                               N/A  Yolk Sac                N/A                                                          Fetal heart activity: N/A  Findings: irregular gestational sac with possible tiny fetal pole, no flicker visualized, incongruent dates     Maternal Structures:  Cervix: The cervix appears long and closed.  Right Ovary: Corpus luteum  Left Ovary: Wnl     Impression:   Small irregular gestational sac seen measuring 10mm. Possible fetal pole measuring 1.3mm. No yolk sac. No fetal cardiac activity visualized. Ultrasound unchanged from previous ultrasound on 6/20/2023.   These findings - absence of a fetal pole with cardiac activity more than 2 weeks after an ultrasound with a gestational sac -  are diagnostic of early pregnancy failure.      Dawna Chatterjee MD    A/P:  (O03.9) Miscarriage  (primary encounter diagnosis)  Plan: miFEPRIStone (MIFEPREX) tablet 200 mg,         misoprostol (CYTOTEC) 200 MCG tablet    Mifepristone 200mg dispensed by provider in the clinic today. Consent reviewed with her, and patient pamphlet given to patient.   Place all 4 tablets of misoprostol vaginally 24 hours after dose of mifepristone that was administered in clinic.  Discussed what to expect as pregnancy passes. Encouraged her to call if she has 2 hours of heavy bleeding that is soaking a pad an hour.   Encouraged her to reach out to alfredo BRO with any concerns during this process, and also to let us know that everything has passed.   Discussed emergency signs and when to report to ER.  Can expect negative pregnancy test 2 weeks after POC are passed.    Braydon Morgan CNM

## 2023-07-24 ENCOUNTER — MYC MEDICAL ADVICE (OUTPATIENT)
Dept: MIDWIFE SERVICES | Facility: CLINIC | Age: 28
End: 2023-07-24
Payer: COMMERCIAL

## 2024-01-16 ENCOUNTER — TELEPHONE (OUTPATIENT)
Dept: OBGYN | Facility: CLINIC | Age: 29
End: 2024-01-16
Payer: COMMERCIAL

## 2024-01-16 DIAGNOSIS — Z32.01 PREGNANCY TEST POSITIVE: Primary | ICD-10-CM

## 2024-01-18 ENCOUNTER — VIRTUAL VISIT (OUTPATIENT)
Dept: OBGYN | Facility: CLINIC | Age: 29
End: 2024-01-18
Payer: COMMERCIAL

## 2024-01-18 ENCOUNTER — ANCILLARY PROCEDURE (OUTPATIENT)
Dept: ULTRASOUND IMAGING | Facility: CLINIC | Age: 29
End: 2024-01-18
Payer: COMMERCIAL

## 2024-01-18 ENCOUNTER — TELEPHONE (OUTPATIENT)
Dept: NURSING | Facility: CLINIC | Age: 29
End: 2024-01-18

## 2024-01-18 VITALS — BODY MASS INDEX: 23.89 KG/M2 | HEIGHT: 59 IN

## 2024-01-18 DIAGNOSIS — Z32.01 PREGNANCY TEST POSITIVE: ICD-10-CM

## 2024-01-18 DIAGNOSIS — Z34.90 ENCOUNTER FOR SUPERVISION OF NORMAL PREGNANCY: Primary | ICD-10-CM

## 2024-01-18 DIAGNOSIS — Z23 NEED FOR TDAP VACCINATION: ICD-10-CM

## 2024-01-18 PROCEDURE — 76801 OB US < 14 WKS SINGLE FETUS: CPT | Mod: 26 | Performed by: OBSTETRICS & GYNECOLOGY

## 2024-01-18 PROCEDURE — 76801 OB US < 14 WKS SINGLE FETUS: CPT

## 2024-01-18 PROCEDURE — 99207 PR NO CHARGE NURSE ONLY: CPT | Mod: 93

## 2024-01-18 NOTE — TELEPHONE ENCOUNTER
Called pt to inform her that her results have not been released to the provider yet and that as soon as they are she will call.

## 2024-01-18 NOTE — PROGRESS NOTES
Important Information for Provider:     New ob nurse intake by phone, fifth pregnancy. Recent SAB's 7/2023, 1/2023.  Ultrasound scheduled for today with Dr Abraham to call back with results. Handouts reviewed. Discussed genetic screening. NOB with Dr Pappas 1/25/2024    Patient's son has congential heart defect.     Prenatal OB Questionnaire  Patient supplied answers from flow sheet for:  Prenatal OB Questionnaire.  Past Medical History  Have you ever recieved care for your mental health? : No  Have you ever been in a major accident or suffered serious trauma?: No  Within the last year, has anyone hit, slapped, kicked or otherwise hurt you?: No  In the last year, has anyone forced you to have sex when you didn't want to?: No    Past Medical History 2   Have you ever received a blood transfusion?: No  Would you accept a blood transfusion if was medically recommended?: Yes  Does anyone in your home smoke?: No   Is your blood type Rh negative?: No  Have you ever ?: (!) Yes  Have you been hospitalized for a nonsurgical reason excluding normal delivery?: No  Have you ever had an abnormal pap smear?: No    Past Medical History (Continued)  Do you have a history of abnormalities of the uterus?: No  Did your mother take ROSANNE or any other hormones when she was pregnant with you?: No  Do you have any other problems we have not asked about which you feel may be important to this pregnancy?: No                     Allergies as of 1/18/2024:    Allergies as of 01/18/2024    (No Known Allergies)       Current medications are:  Current Outpatient Medications   Medication Sig Dispense Refill    Prenatal Vit-DSS-Fe Cbn-FA (PRENATAL AD PO)            Early ultrasound screening tool:    Does patient have irregular periods?  No  Did patient use hormonal birth control in the three months prior to positive urine pregnancy test? No  Is the patient breastfeeding?  No  Is the patient 10 weeks or greater at time of education visit?   No

## 2024-01-19 ENCOUNTER — TELEPHONE (OUTPATIENT)
Dept: NURSING | Facility: CLINIC | Age: 29
End: 2024-01-19
Payer: COMMERCIAL

## 2024-01-19 NOTE — TELEPHONE ENCOUNTER
RN called patient because US imaging has still not been read. Message was sent to [facility] team to inquire about status as the US was yesterday 1/18 at 9:40am. Was told it should hopefully be addressed soon.    Patient stated she was able to see a heart beat on the US and everything was looking fine/normal to her and what the US tech had told her.     Routing to on-call MD to please keep an eye out for final US report so patient can be informed.     WILLIAM Barbour

## 2024-01-22 NOTE — TELEPHONE ENCOUNTER
Called patient on 1/22/24 AM to follow up.  Patient had not received a call regarding results yet.    Discussed normal US results and encouraged to call back with any questions or concerns.    Sada Negrete RN

## 2024-01-24 LAB
ABO/RH(D): NORMAL
ANTIBODY SCREEN: NEGATIVE
SPECIMEN EXPIRATION DATE: NORMAL

## 2024-01-25 ENCOUNTER — PRENATAL OFFICE VISIT (OUTPATIENT)
Dept: OBGYN | Facility: CLINIC | Age: 29
End: 2024-01-25
Payer: COMMERCIAL

## 2024-01-25 VITALS
HEART RATE: 95 BPM | BODY MASS INDEX: 23.23 KG/M2 | SYSTOLIC BLOOD PRESSURE: 95 MMHG | DIASTOLIC BLOOD PRESSURE: 63 MMHG | TEMPERATURE: 97.6 F | WEIGHT: 115 LBS

## 2024-01-25 DIAGNOSIS — Z12.4 SCREENING FOR MALIGNANT NEOPLASM OF CERVIX: ICD-10-CM

## 2024-01-25 DIAGNOSIS — Z34.81 ENCOUNTER FOR SUPERVISION OF OTHER NORMAL PREGNANCY IN FIRST TRIMESTER: Primary | ICD-10-CM

## 2024-01-25 LAB
ALBUMIN UR-MCNC: NEGATIVE MG/DL
APPEARANCE UR: CLEAR
BILIRUB UR QL STRIP: NEGATIVE
COLOR UR AUTO: YELLOW
ERYTHROCYTE [DISTWIDTH] IN BLOOD BY AUTOMATED COUNT: 12.4 % (ref 10–15)
GLUCOSE UR STRIP-MCNC: 500 MG/DL
HBA1C MFR BLD: 4.9 % (ref 0–5.6)
HBV SURFACE AG SERPL QL IA: NONREACTIVE
HCT VFR BLD AUTO: 37.7 % (ref 35–47)
HCV AB SERPL QL IA: NONREACTIVE
HGB BLD-MCNC: 12.4 G/DL (ref 11.7–15.7)
HGB UR QL STRIP: NEGATIVE
HIV 1+2 AB+HIV1 P24 AG SERPL QL IA: NONREACTIVE
KETONES UR STRIP-MCNC: NEGATIVE MG/DL
LEUKOCYTE ESTERASE UR QL STRIP: NEGATIVE
MCH RBC QN AUTO: 28.1 PG (ref 26.5–33)
MCHC RBC AUTO-ENTMCNC: 32.9 G/DL (ref 31.5–36.5)
MCV RBC AUTO: 86 FL (ref 78–100)
NITRATE UR QL: NEGATIVE
PH UR STRIP: 6 [PH] (ref 5–7)
PLATELET # BLD AUTO: 226 10E3/UL (ref 150–450)
RBC # BLD AUTO: 4.41 10E6/UL (ref 3.8–5.2)
RUBV IGG SERPL QL IA: 0.66 INDEX
RUBV IGG SERPL QL IA: NORMAL
SP GR UR STRIP: 1.02 (ref 1–1.03)
T PALLIDUM AB SER QL: NONREACTIVE
UROBILINOGEN UR STRIP-ACNC: 0.2 E.U./DL
WBC # BLD AUTO: 6.1 10E3/UL (ref 4–11)

## 2024-01-25 PROCEDURE — G0145 SCR C/V CYTO,THINLAYER,RESCR: HCPCS | Performed by: OBSTETRICS & GYNECOLOGY

## 2024-01-25 PROCEDURE — 83036 HEMOGLOBIN GLYCOSYLATED A1C: CPT | Performed by: OBSTETRICS & GYNECOLOGY

## 2024-01-25 PROCEDURE — 85027 COMPLETE CBC AUTOMATED: CPT | Performed by: OBSTETRICS & GYNECOLOGY

## 2024-01-25 PROCEDURE — 99213 OFFICE O/P EST LOW 20 MIN: CPT | Performed by: OBSTETRICS & GYNECOLOGY

## 2024-01-25 PROCEDURE — 87389 HIV-1 AG W/HIV-1&-2 AB AG IA: CPT | Performed by: OBSTETRICS & GYNECOLOGY

## 2024-01-25 PROCEDURE — 86850 RBC ANTIBODY SCREEN: CPT | Performed by: OBSTETRICS & GYNECOLOGY

## 2024-01-25 PROCEDURE — 81003 URINALYSIS AUTO W/O SCOPE: CPT | Performed by: OBSTETRICS & GYNECOLOGY

## 2024-01-25 PROCEDURE — 86780 TREPONEMA PALLIDUM: CPT | Performed by: OBSTETRICS & GYNECOLOGY

## 2024-01-25 PROCEDURE — 86762 RUBELLA ANTIBODY: CPT | Performed by: OBSTETRICS & GYNECOLOGY

## 2024-01-25 PROCEDURE — 86803 HEPATITIS C AB TEST: CPT | Performed by: OBSTETRICS & GYNECOLOGY

## 2024-01-25 PROCEDURE — 86900 BLOOD TYPING SEROLOGIC ABO: CPT | Performed by: OBSTETRICS & GYNECOLOGY

## 2024-01-25 PROCEDURE — 87086 URINE CULTURE/COLONY COUNT: CPT | Performed by: OBSTETRICS & GYNECOLOGY

## 2024-01-25 PROCEDURE — 86901 BLOOD TYPING SEROLOGIC RH(D): CPT | Performed by: OBSTETRICS & GYNECOLOGY

## 2024-01-25 PROCEDURE — 87340 HEPATITIS B SURFACE AG IA: CPT | Performed by: OBSTETRICS & GYNECOLOGY

## 2024-01-25 PROCEDURE — 36415 COLL VENOUS BLD VENIPUNCTURE: CPT | Performed by: OBSTETRICS & GYNECOLOGY

## 2024-01-26 LAB — BACTERIA UR CULT: NO GROWTH

## 2024-01-30 LAB
BKR LAB AP GYN ADEQUACY: NORMAL
BKR LAB AP GYN INTERPRETATION: NORMAL
BKR LAB AP HPV REFLEX: NORMAL
BKR LAB AP PREVIOUS ABNORMAL: NORMAL
PATH REPORT.COMMENTS IMP SPEC: NORMAL
PATH REPORT.COMMENTS IMP SPEC: NORMAL
PATH REPORT.RELEVANT HX SPEC: NORMAL

## 2024-02-22 ENCOUNTER — TRANSCRIBE ORDERS (OUTPATIENT)
Dept: MATERNAL FETAL MEDICINE | Facility: HOSPITAL | Age: 29
End: 2024-02-22

## 2024-02-22 ENCOUNTER — PRENATAL OFFICE VISIT (OUTPATIENT)
Dept: OBGYN | Facility: CLINIC | Age: 29
End: 2024-02-22
Payer: COMMERCIAL

## 2024-02-22 VITALS
HEART RATE: 113 BPM | BODY MASS INDEX: 23.23 KG/M2 | SYSTOLIC BLOOD PRESSURE: 98 MMHG | TEMPERATURE: 97.7 F | DIASTOLIC BLOOD PRESSURE: 66 MMHG | WEIGHT: 115 LBS

## 2024-02-22 DIAGNOSIS — O26.90 PREGNANCY RELATED CONDITION, ANTEPARTUM: Primary | ICD-10-CM

## 2024-02-22 DIAGNOSIS — Z34.82 ENCOUNTER FOR SUPERVISION OF OTHER NORMAL PREGNANCY IN SECOND TRIMESTER: ICD-10-CM

## 2024-02-22 DIAGNOSIS — Z82.79 FAMILY HISTORY OF CONGENITAL HEART DEFECT: Primary | ICD-10-CM

## 2024-02-22 PROCEDURE — 99207 PR PRENATAL VISIT: CPT | Performed by: OBSTETRICS & GYNECOLOGY

## 2024-02-22 NOTE — PROGRESS NOTES
Doing ok.   Having some lightheadedness when standing from sitting.   Hasn't had in other pregnancies. Will try compression stockings.   Nausea resolved around 20 weeks with other pregnancies. Staying hydrated.   Having constipation. Eats a lot of fruit. Discussed fiber. Discussed Smooth move.   Discussed mild cervical prolapse, answered questions. Will have cervical length with US, wasn't short at last visit. Will let me know if she feels prolapse past introitus.   RTC 4 weeks.

## 2024-02-25 ENCOUNTER — HEALTH MAINTENANCE LETTER (OUTPATIENT)
Age: 29
End: 2024-02-25

## 2024-03-19 ENCOUNTER — PRE VISIT (OUTPATIENT)
Dept: MATERNAL FETAL MEDICINE | Facility: HOSPITAL | Age: 29
End: 2024-03-19
Payer: COMMERCIAL

## 2024-03-22 ENCOUNTER — OFFICE VISIT (OUTPATIENT)
Dept: MATERNAL FETAL MEDICINE | Facility: HOSPITAL | Age: 29
End: 2024-03-22
Attending: STUDENT IN AN ORGANIZED HEALTH CARE EDUCATION/TRAINING PROGRAM
Payer: COMMERCIAL

## 2024-03-22 ENCOUNTER — ANCILLARY PROCEDURE (OUTPATIENT)
Dept: ULTRASOUND IMAGING | Facility: HOSPITAL | Age: 29
End: 2024-03-22
Attending: STUDENT IN AN ORGANIZED HEALTH CARE EDUCATION/TRAINING PROGRAM
Payer: COMMERCIAL

## 2024-03-22 DIAGNOSIS — O26.90 PREGNANCY RELATED CONDITION, ANTEPARTUM: ICD-10-CM

## 2024-03-22 DIAGNOSIS — Z82.79 FAMILY HISTORY OF CONGENITAL ANOMALY: ICD-10-CM

## 2024-03-22 DIAGNOSIS — O28.3 ECHOGENIC INTRACARDIAC FOCUS OF FETUS ON PRENATAL ULTRASOUND: ICD-10-CM

## 2024-03-22 DIAGNOSIS — Z36.2 ENCOUNTER FOR FOLLOW-UP ULTRASOUND OF FETAL ANATOMY: Primary | ICD-10-CM

## 2024-03-22 PROCEDURE — 99203 OFFICE O/P NEW LOW 30 MIN: CPT | Mod: 25 | Performed by: STUDENT IN AN ORGANIZED HEALTH CARE EDUCATION/TRAINING PROGRAM

## 2024-03-22 PROCEDURE — 76811 OB US DETAILED SNGL FETUS: CPT

## 2024-03-22 PROCEDURE — 76811 OB US DETAILED SNGL FETUS: CPT | Mod: 26 | Performed by: STUDENT IN AN ORGANIZED HEALTH CARE EDUCATION/TRAINING PROGRAM

## 2024-03-22 NOTE — NURSING NOTE
Sailaja S Elizabet is a  at 18w4d who presents to [facility] for a comprehensive ultrasound. Pt reports positive fetal movement. Pt denies bldg/lof/change in discharge, contractions, headache, vision changes, chest pain/SOB or edema. SBAR given to Dr. KARINE Campbell, see note in Epic.      Becki Em RN

## 2024-03-22 NOTE — PROGRESS NOTES
Please see the full imaging report from the ViewPoint program under the imaging tab.    Deena Campbell MD  Maternal Fetal Medicine

## 2024-04-11 ENCOUNTER — PRENATAL OFFICE VISIT (OUTPATIENT)
Dept: OBGYN | Facility: CLINIC | Age: 29
End: 2024-04-11
Payer: COMMERCIAL

## 2024-04-11 ENCOUNTER — ORDERS ONLY (AUTO-RELEASED) (OUTPATIENT)
Dept: OBGYN | Facility: CLINIC | Age: 29
End: 2024-04-11

## 2024-04-11 VITALS
SYSTOLIC BLOOD PRESSURE: 109 MMHG | TEMPERATURE: 98.5 F | BODY MASS INDEX: 24.44 KG/M2 | WEIGHT: 121 LBS | HEART RATE: 93 BPM | DIASTOLIC BLOOD PRESSURE: 69 MMHG

## 2024-04-11 DIAGNOSIS — R00.2 PALPITATIONS: Primary | ICD-10-CM

## 2024-04-11 DIAGNOSIS — Z34.82 ENCOUNTER FOR SUPERVISION OF OTHER NORMAL PREGNANCY IN SECOND TRIMESTER: ICD-10-CM

## 2024-04-11 DIAGNOSIS — R00.2 PALPITATIONS: ICD-10-CM

## 2024-04-11 LAB
ERYTHROCYTE [DISTWIDTH] IN BLOOD BY AUTOMATED COUNT: 13.9 % (ref 10–15)
HCT VFR BLD AUTO: 34.7 % (ref 35–47)
HGB BLD-MCNC: 11.3 G/DL (ref 11.7–15.7)
MCH RBC QN AUTO: 29.5 PG (ref 26.5–33)
MCHC RBC AUTO-ENTMCNC: 32.6 G/DL (ref 31.5–36.5)
MCV RBC AUTO: 91 FL (ref 78–100)
PLATELET # BLD AUTO: 205 10E3/UL (ref 150–450)
RBC # BLD AUTO: 3.83 10E6/UL (ref 3.8–5.2)
WBC # BLD AUTO: 8.6 10E3/UL (ref 4–11)

## 2024-04-11 PROCEDURE — 99207 PR PRENATAL VISIT: CPT | Performed by: OBSTETRICS & GYNECOLOGY

## 2024-04-11 PROCEDURE — 85027 COMPLETE CBC AUTOMATED: CPT | Performed by: OBSTETRICS & GYNECOLOGY

## 2024-04-11 PROCEDURE — 80048 BASIC METABOLIC PNL TOTAL CA: CPT | Performed by: OBSTETRICS & GYNECOLOGY

## 2024-04-11 PROCEDURE — 99213 OFFICE O/P EST LOW 20 MIN: CPT | Mod: 25 | Performed by: OBSTETRICS & GYNECOLOGY

## 2024-04-11 PROCEDURE — 36415 COLL VENOUS BLD VENIPUNCTURE: CPT | Performed by: OBSTETRICS & GYNECOLOGY

## 2024-04-11 PROCEDURE — 84443 ASSAY THYROID STIM HORMONE: CPT | Performed by: OBSTETRICS & GYNECOLOGY

## 2024-04-11 NOTE — PROGRESS NOTES
Doing ok.   Good fetal movement.   Having spells of rapid heart rate multiple times a day. Started about a month ago. Sometimes makes her have to sit or lie down and take slow breaths. Doesn't wake her up at night, but harder to fall asleep. Still feeling pretty fatigued as well.   Compression stockings didn't change it either way.   GCT next visit.     (R00.2) Palpitations  (primary encounter diagnosis)  Comment:   Plan: CBC with platelets, TSH with free T4 reflex,         Basic metabolic panel  (Ca, Cl, CO2, Creat,         Gluc, K, Na, BUN), ZIO PATCH MAIL OUT            (Z34.82) Encounter for supervision of other normal pregnancy in second trimester  Comment:   Plan: Glucose tolerance, gest screen, 1 hour, ZIO         PATCH MAIL OUT

## 2024-04-12 LAB
ANION GAP SERPL CALCULATED.3IONS-SCNC: 12 MMOL/L (ref 7–15)
BUN SERPL-MCNC: 7.7 MG/DL (ref 6–20)
CALCIUM SERPL-MCNC: 9.3 MG/DL (ref 8.6–10)
CHLORIDE SERPL-SCNC: 104 MMOL/L (ref 98–107)
CREAT SERPL-MCNC: 0.49 MG/DL (ref 0.51–0.95)
DEPRECATED HCO3 PLAS-SCNC: 21 MMOL/L (ref 22–29)
EGFRCR SERPLBLD CKD-EPI 2021: >90 ML/MIN/1.73M2
GLUCOSE SERPL-MCNC: 94 MG/DL (ref 70–99)
POTASSIUM SERPL-SCNC: 3.6 MMOL/L (ref 3.4–5.3)
SODIUM SERPL-SCNC: 137 MMOL/L (ref 135–145)
TSH SERPL DL<=0.005 MIU/L-ACNC: 2.14 UIU/ML (ref 0.3–4.2)

## 2024-04-19 ENCOUNTER — MYC MEDICAL ADVICE (OUTPATIENT)
Dept: OBGYN | Facility: CLINIC | Age: 29
End: 2024-04-19
Payer: COMMERCIAL

## 2024-04-22 ENCOUNTER — OFFICE VISIT (OUTPATIENT)
Dept: MATERNAL FETAL MEDICINE | Facility: HOSPITAL | Age: 29
End: 2024-04-22
Attending: OBSTETRICS & GYNECOLOGY
Payer: COMMERCIAL

## 2024-04-22 ENCOUNTER — ANCILLARY PROCEDURE (OUTPATIENT)
Dept: ULTRASOUND IMAGING | Facility: HOSPITAL | Age: 29
End: 2024-04-22
Attending: OBSTETRICS & GYNECOLOGY
Payer: COMMERCIAL

## 2024-04-22 DIAGNOSIS — Z36.2 ENCOUNTER FOR FOLLOW-UP ULTRASOUND OF FETAL ANATOMY: ICD-10-CM

## 2024-04-22 DIAGNOSIS — Z36.2 ENCOUNTER FOR FOLLOW-UP ULTRASOUND OF FETAL ANATOMY: Primary | ICD-10-CM

## 2024-04-22 PROCEDURE — 76816 OB US FOLLOW-UP PER FETUS: CPT | Mod: 26 | Performed by: OBSTETRICS & GYNECOLOGY

## 2024-04-22 PROCEDURE — 99207 PR NO CHARGE LOS: CPT | Performed by: OBSTETRICS & GYNECOLOGY

## 2024-04-22 PROCEDURE — 76816 OB US FOLLOW-UP PER FETUS: CPT

## 2024-04-22 NOTE — PROGRESS NOTES
Please refer to ultrasound report under 'Imaging' Studies of 'Chart Review' tabs.    Diaz Olivera M.D.

## 2024-04-22 NOTE — NURSING NOTE
Sailaja Mcneal is a  at 23w0d who presents to [facility] for a follow-up ultrasound. Pt reports positive fetal movement. Pt denies bldg/lof/change in discharge, contractions, headache, vision changes, chest pain/SOB or edema. SBAR given to Dr. Olivera, see note in Epic.     Becki Em RN

## 2024-04-22 NOTE — TELEPHONE ENCOUNTER
RN attempt to email Kaylee regarding order - Email was forwarded to Aylin Jordan.  Have not received email back.    RN attempted to call [facility] at [phone] to inquire about zio patch.    Per the rep I spoke to - if it is ordered as mail order and they have not received the monitor then the patient should call them at the above number and they will reach out to the department to get one sent out.    Patient notified of process and how to call.    Sada Negrete, RN

## 2024-04-23 ENCOUNTER — ORDERS ONLY (AUTO-RELEASED) (OUTPATIENT)
Dept: CARDIOLOGY | Facility: CLINIC | Age: 29
End: 2024-04-23
Payer: COMMERCIAL

## 2024-04-23 DIAGNOSIS — R00.2 PALPITATIONS: ICD-10-CM

## 2024-04-23 DIAGNOSIS — Z34.82 ENCOUNTER FOR SUPERVISION OF OTHER NORMAL PREGNANCY IN SECOND TRIMESTER: ICD-10-CM

## 2024-05-09 ENCOUNTER — ANCILLARY PROCEDURE (OUTPATIENT)
Dept: ULTRASOUND IMAGING | Facility: HOSPITAL | Age: 29
End: 2024-05-09
Attending: OBSTETRICS & GYNECOLOGY
Payer: COMMERCIAL

## 2024-05-09 ENCOUNTER — OFFICE VISIT (OUTPATIENT)
Dept: CARDIOLOGY | Facility: CLINIC | Age: 29
End: 2024-05-09

## 2024-05-09 DIAGNOSIS — Z82.79 FAMILY HISTORY OF CONGENITAL ANOMALY: ICD-10-CM

## 2024-05-09 DIAGNOSIS — O35.BXX0 ANOMALY OF HEART OF FETUS AFFECTING PREGNANCY, ANTEPARTUM, SINGLE OR UNSPECIFIED FETUS: Primary | ICD-10-CM

## 2024-05-09 PROCEDURE — 76827 ECHO EXAM OF FETAL HEART: CPT | Mod: 26 | Performed by: PEDIATRICS

## 2024-05-09 PROCEDURE — 93325 DOPPLER ECHO COLOR FLOW MAPG: CPT | Mod: 26 | Performed by: PEDIATRICS

## 2024-05-09 PROCEDURE — 93325 DOPPLER ECHO COLOR FLOW MAPG: CPT

## 2024-05-09 PROCEDURE — 76825 ECHO EXAM OF FETAL HEART: CPT | Mod: 26 | Performed by: PEDIATRICS

## 2024-05-09 PROCEDURE — 99202 OFFICE O/P NEW SF 15 MIN: CPT | Mod: 25 | Performed by: PEDIATRICS

## 2024-05-09 PROCEDURE — 76827 ECHO EXAM OF FETAL HEART: CPT

## 2024-05-09 NOTE — PROGRESS NOTES
[facility]   Heart Center Fetal Cardiology Consult    Patient:  Sailaja Mcneal MRN:  [id number]   YOB: 1995 Age:  28 year old   Date of Visit:  5/9/2024 PCP:  System, Provider Not In   Due Date: Aug 19, 2024 Delivery:      Dear Dr. Pappas    I had the opportunity to meet with Sailaja today for a Fetal Cardiology Consult and Fetal Echocardiography at the [facility] on 5/9/2024    Fetal Echo demonstrated Normal fetal cardiac anatomy. Normal right and left ventricular size and function. Fetal heart rate is regular at 139 bpm. No hydrops.    I have reviewed the normal Fetal Echo findings.     The parents had appropriate questions. I did my best to answer their questions.    Plan:  1. No additional fetal echo is needed    Thank you for allowing me to participate in Sailaja's care.  Feel free to contact me with questions.    I spent 10 minutes counseling the patient about her fetal echocardiogram findings. All of this time was face to face.    Dr Stacia Guerra  Pediatric Cardiologist  [facility]  Phone [phone]

## 2024-05-13 PROCEDURE — 93248 EXT ECG>7D<15D REV&INTERPJ: CPT | Performed by: INTERNAL MEDICINE

## 2024-05-23 ENCOUNTER — PRENATAL OFFICE VISIT (OUTPATIENT)
Dept: OBGYN | Facility: CLINIC | Age: 29
End: 2024-05-23
Payer: COMMERCIAL

## 2024-05-23 VITALS
DIASTOLIC BLOOD PRESSURE: 60 MMHG | SYSTOLIC BLOOD PRESSURE: 94 MMHG | BODY MASS INDEX: 25.65 KG/M2 | HEART RATE: 91 BPM | WEIGHT: 127 LBS | TEMPERATURE: 97.2 F

## 2024-05-23 DIAGNOSIS — R73.09 ABNORMAL GLUCOSE TOLERANCE TEST: ICD-10-CM

## 2024-05-23 DIAGNOSIS — Z23 NEED FOR TDAP VACCINATION: ICD-10-CM

## 2024-05-23 DIAGNOSIS — Z34.82 ENCOUNTER FOR SUPERVISION OF OTHER NORMAL PREGNANCY IN SECOND TRIMESTER: Primary | ICD-10-CM

## 2024-05-23 LAB — GLUCOSE 1H P 50 G GLC PO SERPL-MCNC: 146 MG/DL (ref 70–129)

## 2024-05-23 PROCEDURE — 36415 COLL VENOUS BLD VENIPUNCTURE: CPT | Performed by: OBSTETRICS & GYNECOLOGY

## 2024-05-23 PROCEDURE — 90471 IMMUNIZATION ADMIN: CPT | Performed by: OBSTETRICS & GYNECOLOGY

## 2024-05-23 PROCEDURE — 90715 TDAP VACCINE 7 YRS/> IM: CPT | Performed by: OBSTETRICS & GYNECOLOGY

## 2024-05-23 PROCEDURE — 99207 PR PRENATAL VISIT: CPT | Performed by: OBSTETRICS & GYNECOLOGY

## 2024-05-23 PROCEDURE — 82950 GLUCOSE TEST: CPT | Performed by: OBSTETRICS & GYNECOLOGY

## 2024-05-23 NOTE — PROGRESS NOTES
Childbirth classes? NO  Plan on breastfeeding? Yes: thinking about vasectomy  Birthcontrol? Maybe vasectomy  Sex on ultrasound? girl  Circumcision? NA  Peds doc? [place]    Doing well.   Good fetal movement.   GCT today.   RTC 4 weeks

## 2024-05-31 ENCOUNTER — LAB (OUTPATIENT)
Dept: LAB | Facility: CLINIC | Age: 29
End: 2024-05-31
Payer: COMMERCIAL

## 2024-05-31 DIAGNOSIS — R73.09 ABNORMAL GLUCOSE TOLERANCE TEST: ICD-10-CM

## 2024-05-31 PROCEDURE — 82951 GLUCOSE TOLERANCE TEST (GTT): CPT

## 2024-05-31 PROCEDURE — 82952 GTT-ADDED SAMPLES: CPT

## 2024-05-31 PROCEDURE — 36415 COLL VENOUS BLD VENIPUNCTURE: CPT

## 2024-06-01 LAB
GESTATIONAL GTT 1 HR POST DOSE: 149 MG/DL (ref 60–179)
GESTATIONAL GTT 2 HR POST DOSE: 117 MG/DL (ref 60–154)
GESTATIONAL GTT 3 HR POST DOSE: 185 MG/DL (ref 60–139)
GLUCOSE P FAST SERPL-MCNC: 79 MG/DL (ref 60–94)

## 2024-06-12 ENCOUNTER — PRENATAL OFFICE VISIT (OUTPATIENT)
Dept: MIDWIFE SERVICES | Facility: CLINIC | Age: 29
End: 2024-06-12
Payer: COMMERCIAL

## 2024-06-12 VITALS
DIASTOLIC BLOOD PRESSURE: 67 MMHG | BODY MASS INDEX: 25.85 KG/M2 | WEIGHT: 128 LBS | TEMPERATURE: 97.5 F | SYSTOLIC BLOOD PRESSURE: 102 MMHG | HEART RATE: 97 BPM

## 2024-06-12 DIAGNOSIS — Z34.83 ENCOUNTER FOR SUPERVISION OF OTHER NORMAL PREGNANCY IN THIRD TRIMESTER: Primary | ICD-10-CM

## 2024-06-12 PROCEDURE — 99207 PR PRENATAL VISIT: CPT | Performed by: ADVANCED PRACTICE MIDWIFE

## 2024-06-12 NOTE — PROGRESS NOTES
30w2d  Sailaja is here with her two sons for prenatal visit. Feeling well overall. Reports good fetal movement. Denies leaking of fluid, vaginal bleeding, regular uterine contractions, headache, visual changes, or other concerns. Got TDAP at last visit. Seeing OBGYN team but scheduled with CNM today due to scheduling constraints. Experiencing occasional GERD. Recommend TUMS or Pepcid. If symptoms persist, consider omeprazole. Discussed Birthplace location, how to get to hospital, what to expect upon admission (fetal monitoring options, IV, etc.). She is considering bringing other kids to hospital as she doesn't have a lot of options for childcare. Her  will be there to watch kids. RTC in 2 weeks.    VERENICE Jamil CNM

## 2024-07-16 ENCOUNTER — PRENATAL OFFICE VISIT (OUTPATIENT)
Dept: OBGYN | Facility: CLINIC | Age: 29
End: 2024-07-16
Payer: COMMERCIAL

## 2024-07-16 VITALS
SYSTOLIC BLOOD PRESSURE: 108 MMHG | TEMPERATURE: 97.5 F | BODY MASS INDEX: 26.46 KG/M2 | WEIGHT: 131 LBS | HEART RATE: 87 BPM | DIASTOLIC BLOOD PRESSURE: 72 MMHG

## 2024-07-16 DIAGNOSIS — O26.843 UTERINE SIZE-DATE DISCREPANCY IN THIRD TRIMESTER: ICD-10-CM

## 2024-07-16 DIAGNOSIS — Z34.83 ENCOUNTER FOR SUPERVISION OF OTHER NORMAL PREGNANCY IN THIRD TRIMESTER: Primary | ICD-10-CM

## 2024-07-16 PROCEDURE — 99207 PR PRENATAL VISIT: CPT | Performed by: OBSTETRICS & GYNECOLOGY

## 2024-07-16 NOTE — Clinical Note
This patient used to see Dr Pappas.  She is looking for Friday afternoon appts so  can come with her.  Could you please call her and help her get appts at RD for Friday afternoon if possible?

## 2024-07-16 NOTE — PROGRESS NOTES
35w1d   No complaints.  Starting to feel more tired. Baby is active.  Maternal weight gain chart reviewed and below expected weight gain.  Patient reports she is only eating a couple meals a day.  Recommended at least 4 small meals a day.   S<D today.  Will get a growth US.  Recommend weekly visits now.  RR

## 2024-07-17 ENCOUNTER — TRANSCRIBE ORDERS (OUTPATIENT)
Dept: MATERNAL FETAL MEDICINE | Facility: HOSPITAL | Age: 29
End: 2024-07-17
Payer: COMMERCIAL

## 2024-07-17 DIAGNOSIS — O26.90 PREGNANCY RELATED CONDITION, ANTEPARTUM: Primary | ICD-10-CM

## 2024-07-22 ENCOUNTER — ANCILLARY PROCEDURE (OUTPATIENT)
Dept: ULTRASOUND IMAGING | Facility: HOSPITAL | Age: 29
End: 2024-07-22
Attending: STUDENT IN AN ORGANIZED HEALTH CARE EDUCATION/TRAINING PROGRAM
Payer: COMMERCIAL

## 2024-07-22 ENCOUNTER — OFFICE VISIT (OUTPATIENT)
Dept: MATERNAL FETAL MEDICINE | Facility: HOSPITAL | Age: 29
End: 2024-07-22
Attending: STUDENT IN AN ORGANIZED HEALTH CARE EDUCATION/TRAINING PROGRAM
Payer: COMMERCIAL

## 2024-07-22 DIAGNOSIS — Z31.5 ENCOUNTER FOR PROCREATIVE GENETIC COUNSELING: Primary | ICD-10-CM

## 2024-07-22 DIAGNOSIS — O26.90 PREGNANCY RELATED CONDITION, ANTEPARTUM: ICD-10-CM

## 2024-07-22 DIAGNOSIS — Z36.2 ENCOUNTER FOR FOLLOW-UP ULTRASOUND OF FETAL ANATOMY: Primary | ICD-10-CM

## 2024-07-22 PROCEDURE — 999N000069 HC STATISTIC GENETIC COUNSELING, < 16 MIN: Performed by: GENETIC COUNSELOR, MS

## 2024-07-22 PROCEDURE — 76816 OB US FOLLOW-UP PER FETUS: CPT

## 2024-07-22 PROCEDURE — 76816 OB US FOLLOW-UP PER FETUS: CPT | Mod: 26 | Performed by: STUDENT IN AN ORGANIZED HEALTH CARE EDUCATION/TRAINING PROGRAM

## 2024-07-22 PROCEDURE — 99213 OFFICE O/P EST LOW 20 MIN: CPT | Mod: 25 | Performed by: STUDENT IN AN ORGANIZED HEALTH CARE EDUCATION/TRAINING PROGRAM

## 2024-07-22 NOTE — PROGRESS NOTES
"[facility]  Genetic Counseling Consult    Patient:  Sailaja Mcneal  Preferred Name: Sailaja YOB: 1995   Date of Service:  24   MRN: [id number]    Sailaja was seen at the [facility] for genetic consultation. The indication for genetic counseling is  concern for small fetal size due to fundal measurement . The patient was accompanied to this visit by their partner and their two sons.    The session was conducted in English.      IMPRESSION/ PLAN   1. Sailaja has not had genetic screening in this pregnancy and declines options discussed today.    2. During today's [facility] visit, Sailaja had a genetic counseling session only. Screening and diagnostic testing was discussed and declined.     3. Sailaja had a ultrasound today. Please see the ultrasound report for further details.     PREGNANCY HISTORY   /Parity:       Jono pregnancy history is significant for:   2019: Term, , male, VSD (balloon closure)  2020: Term, , male    CURRENT PREGNANCY   Current Age: 28 year old   Age at Delivery: 28 year old  ASHLEE: 2024, by Ultrasound                                   Gestational Age: 36w0d  This pregnancy is a single gestation.   This pregnancy was conceived spontaneously.    MEDICAL HISTORY   Sailaja davis reported medical history is not expected to impact pregnancy management or risks to fetal development.       FAMILY HISTORY   A three-generation pedigree was obtained today and is scanned under the \"Media\" tab in Epic. The family history was reported by Sailaja and their partner.    The following significant findings were reported today:   The father of the pregnancy is healthy. He has a history of gout.  Sailaja and her partner have two healthy sons. Her oldest son was born with a VSD and had balloon closure.   Congenital heart defects can be isolated or associated with multiple birth " defects (syndromic).There are many genetic syndromes, single gene disorders or chromosomal abnormalities, that can be associated with congenital heart defects. Isolated congenital heart defects are usually a multi-factorial condition caused by the combination of genetic and environmental factors and familial clustering is not uncommon. Since there is a genetic component to multi-factorial conditions, the recurrence risk is higher for first degree relatives (siblings) than in second degree relatives and decreases with distance in relationship.Congenital heart defects are common, occurring in 0.5 to 1.0% live births. The specific recurrence risk for first degree relatives differs according to the type of congenital heart defect and if there is an associated genetic syndrome present. In general, studies show that the overall risk contributed by a positive family history of a congenital heart defect in 1st degree relatives for the same defect is 8.15% whereas the recurrence risk for a different heart defect is 2.68%. There are also recurrence risks specific to the heart defect. Often risks for second degree relatives are not available. Furthermore, for third degree relatives the risk is likely equal to general population risks.   This pregnancy already had a normal fetal echocardiogram. Fetal echocardiograms are recommended in any future pregnancy as well.    The remaining family history if unremarkable.     Otherwise, the reported family history is unremarkable for multiple miscarriages, stillbirths, birth defects, intellectual disabilities, known genetic conditions, and consanguinity.       RISK ASSESSMENT FOR INHERITED CONDITIONS AND CARRIER SCREENING OPTIONS   Expanded carrier screening is available to screen for autosomal recessive conditions and X-linked conditions in a large list of genes. Carrier screening does not test the pregnancy but gives a risk assessment for the pregnancy and future pregnancies to have  the condition. Expanded carrier screening is designed to identify carrier status for conditions that are primarily childhood or adolescent onset. Expanded carrier screening does not evaluate for adult-onset conditions such as hereditary cancer syndromes, dementia/ Alzheimer's disease, or cardiovascular disease risk factors. Additionally, expanded carrier screening is not comprehensive for all known genetic diseases or inherited conditions. Carrier screening does not test for all genetic and health conditions or risk factors.     Autosomal recessive conditions happen when a mutation has been inherited from the egg and sperm and include conditions like cystic fibrosis, thalassemia, hearing loss, spinal muscular atrophy, and more. We reviewed that when both biological parents carry a harmful genetic change in a gene associated with autosomal recessive inheritance, each of their pregnancies has a 1 in 4 (25%) chance to be affected by that condition. X-linked conditions happen when a mutation has been inherited from the egg and include conditions like fragile X syndrome.With x-linked conditions, the specific risk generally depends on the chromosomal sex of the fetus, with XY individuals (generally male) being most severely affected.      screening and expanded carrier screening was not discussed due to focus on other topics. About [place] Screening    RISK ASSESSMENT FOR CHROMOSOME CONDITIONS   We explained that the risk for fetal chromosome abnormalities increases with maternal age. We discussed specific features of common chromosome abnormalities, including trisomy 21 (Down syndrome), trisomy 13, trisomy 18, and sex chromosome trisomies.    At age 28 at midtrimester, the risk to have a baby with Down syndrome is 1 in 928.   At age 28 at midtrimester, the risk to have a baby with any chromosome abnormality is 1 in 464.     Sailaja has not had genetic screening in this pregnancy and declines options discussed  today.     GENETIC TESTING OPTIONS   Genetic testing during a pregnancy includes screening and diagnostic procedures.      Screening tests are non-invasive which means no risk to the pregnancy and includes ultrasounds and blood work. The benefits and limitations of screening were reviewed. Screening tests provide a risk assessment (chance) specific to the pregnancy for certain fetal chromosome abnormalities but cannot definitively diagnose or exclude a fetal chromosome abnormality. Follow-up genetic counseling and consideration of diagnostic testing is recommended with any abnormal screening result. Diagnostic testing during a pregnancy is more certain and can test for more conditions. However, the tests do have a risk of miscarriage that requires careful consideration. These tests can detect fetal chromosome abnormalities with greater than 99% certainty. Results can be compromised by maternal cell contamination or mosaicism and are limited by the resolution of current genetic testing technology.     There is no screening or diagnostic test that detects all forms of birth defects or intellectual disability.     We discussed the following screening options:     Non-invasive prenatal testing (NIPT)  Also called cell-free DNA screening because it detects chromosomes from the placenta in the pregnant person's blood  Can be done any time after 10 weeks gestation  Standard recommendation for NIPT screens for trisomy 21, trisomy 18, trisomy 13, with the option of adding sex chromosome aneuploidies, without or without predicted sex  Cannot screen for open neural tube defects, maternal serum AFP after 15 weeks is recommended  New NIPT options include screening for other trisomies, microdeletion syndromes, and in some cases fetal blood antigens. Guidelines do not recommend these conditions are included in standard screening. These options have limitations and should be discussed with a genetic counselor.   However, current  (2023) ACMG guidelines do recommend that screening for one microdeletion syndrome, called 22q11.2 deletion syndrome be offered to all pregnant patients. 22q11.2 deletion syndrome has an estimated prevalence of 1 in 990 to 1 in 2148 (0.05-0.1%). Risk is not thought to increase with maternal age. Clinical features are variable but include congenital heart defects, cleft palate, developmental delays, immune system deficiencies, and hearing loss. Approximately 90% of cases are de farooq (a sporadic new change in a pregnancy). Cell-free DNA screening for 22q11.2 deletion syndrome is available with the inclusion of other microdeletion syndromes. There is less data about the performance of cell-free DNA screening for more rare microdeletions and the chance for false positives or negative may be increased.  We discussed the limitations of cell-free DNA screening in detecting microdeletions and the possiblity of false positives and false negatives.     It was a pleasure to be involved with Sailaja [facility]. Face-to-face time of the meeting was 15 minutes.    Rivka Sams GC, MS, [facility]  Board Certified and [place] Licensed Genetic Counselor  [facility]  Maternal Fetal Medicine  Office: [phone]  [facility]: [phone]   Fax: [phone]  [facility]

## 2024-07-22 NOTE — PROGRESS NOTES
The patient was seen for an ultrasound in the [facility] today.  For a detailed report of the ultrasound examination, please see the ultrasound report which can be found under the imaging tab.     If you have questions regarding today's evaluation or if we can be of further service, please contact the Maternal-Fetal Medicine Center.      Deena Campbell MD  Maternal Fetal Medicine

## 2024-07-25 NOTE — PATIENT INSTRUCTIONS
Weeks 34 to 36 of Your Pregnancy: Care Instructions  Your belly has grown quite large. It's almost time to give birth! Your baby's lungs are almost ready to breathe air. The skull bones are firm enough to protect your baby's head. But they're soft enough to move down through the birth canal.    You might be wondering what to expect during labor. Because each birth is different, there's no way to know exactly what childbirth will be like for you. Talk to your doctor or midwife about any concerns you have.    You'll probably have a test for group B streptococcus (GBS). GBS is bacteria that can live in the vagina and rectum. GBS can make your baby sick after birth. If you test positive, you'll get antibiotics during labor.    Choose what type of pain relief you would like during labor.  You can choose from a few types, including medicine and non-medicine options. You may want to use several types of pain relief.     Know how medicines can help with pain during labor.  Some medicines lower anxiety and help with some of the pain. Others make your lower body numb so that you will feel less pain.     Tell your doctor about your pain medicine choice.  Do this before you start labor or very early in your labor. You may be able to change your mind during labor.     Learn about the stages of labor.    The first stage includes the early (latent) and active phases of labor. Contractions start in early labor. During active labor, contractions get stronger, last longer, and happen more often. And the cervix opens more rapidly.  The second stage starts when you're ready to push. During this stage, your baby is born.  During the third stage, you'll usually have a few more contractions to push out the placenta.   Follow-up care is a key part of your treatment and safety. Be sure to make and go to all appointments, and call your doctor if you are having problems. It's also a good idea to know your test results and keep a list of the  "medicines you take.  Where can you learn more?  Go to https://www.[employer].net/patiented  Enter B912 in the search box to learn more about \"Weeks 34 to 36 of Your Pregnancy: Care Instructions.\"  Current as of: July 10, 2023               Content Version: 14.0    7550-4754 [employer].   Care instructions adapted under license by your healthcare professional. If you have questions about a medical condition or this instruction, always ask your healthcare professional. [employer] disclaims any warranty or liability for your use of this information.      Group B Strep During Pregnancy: Care Instructions  Overview     Group B strep infection is caused by a type of bacteria. It's a different kind of bacteria than the kind that causes strep throat.  You may have this kind of bacteria in your body. Sometimes it may cause an infection, but most of the time it doesn't make you sick or cause symptoms. But if you pass the bacteria to your baby during the birth, it can cause serious health problems for your baby.  If you have this bacteria in your body, you will get antibiotics when you are in labor. Antibiotics help prevent problems for a  baby.  After birth, doctors will watch and may test your baby. If your baby tests positive for Group B strep, your baby will get antibiotics.  If you plan to breastfeed your baby, don't worry. It will be safe to breastfeed.  Follow-up care is a key part of your treatment and safety. Be sure to make and go to all appointments, and call your doctor if you are having problems. It's also a good idea to know your test results and keep a list of the medicines you take.  How can you care for yourself at home?  If your doctor has prescribed antibiotics, take them as directed. Do not stop taking them just because you feel better. You need to take the full course of antibiotics.  Tell your doctor if you are allergic to any antibiotic.  If you go into labor, or your " "water breaks, go to the hospital. Your doctor will give you antibiotics to help protect your baby from infection.  Tell the doctors and nurses if you have an allergy to penicillin.  Tell the doctors and nurses at the hospital that you tested positive for group B strep.  When should you call for help?   Call your doctor now or seek immediate medical care if:    You have symptoms of a urinary tract infection. These may include:  Pain or burning when you urinate.  A frequent need to urinate without being able to pass much urine.  Pain in the flank, which is just below the rib cage and above the waist on either side of the back.  Blood in your urine.  A fever.     You think you are in labor or your water has broken.     You have pain in your belly or pelvis.   Watch closely for changes in your health, and be sure to contact your doctor if you have any problems.  Where can you learn more?  Go to https://www.[employer].Askablogr/patiented  Enter M001 in the search box to learn more about \"Group B Strep During Pregnancy: Care Instructions.\"  Current as of: June 12, 2023               Content Version: 14.0    9013-3446 [employer].   Care instructions adapted under license by your healthcare professional. If you have questions about a medical condition or this instruction, always ask your healthcare professional. [employer] disclaims any warranty or liability for your use of this information.      Circumcision in Infants: What to Expect at Home  Your Child's Recovery  After circumcision, your baby's penis may look red and swollen. It may have petroleum jelly and gauze on it. The gauze will likely come off when your baby urinates. Follow your doctor's directions about whether to put clean gauze back on your baby's penis or to leave the gauze off. If you need to remove gauze from the penis, use warm water to soak the gauze and gently loosen it.  The doctor may have used a Plastibell device to do the " circumcision. If so, your baby will have a plastic ring around the head of the penis. The ring should fall off by itself in 10 to 12 days.  A thin, yellow film may form over the area the day after the procedure. This is part of the normal healing process. It should go away in a few days.  Your baby may seem fussy while the area heals. It may hurt for your baby to urinate. This pain often gets better in 3 or 4 days. But it may last for up to 2 weeks.  Even though your baby's penis will likely start to feel better after 3 or 4 days, it may look worse. The penis often starts to look like it's getting better after about 7 to 10 days.  This care sheet gives you a general idea about how long it will take for your child to recover. But each child recovers at a different pace. Follow the steps below to help your child get better as quickly as possible.  How can you care for your child at home?  Activity    Let your baby rest as much as possible. Sleeping will help with recovery.     You can give your baby a sponge bath the day after surgery. Ask your doctor when it is okay to give your baby a bath.   Medicines    Your doctor will tell you if and when your child can restart any medicines. The doctor will also give you instructions about your child taking any new medicines.     Your doctor may recommend giving your baby acetaminophen (Tylenol) to help with pain after the procedure. Be safe with medicines. Give your child medicines exactly as prescribed. Call your doctor if you think your child is having a problem with a medicine.     Do not give your child two or more pain medicines at the same time unless the doctor told you to. Many pain medicines have acetaminophen, which is Tylenol. Too much acetaminophen (Tylenol) can be harmful.   Circumcision care    Always wash your hands before and after touching the circumcision area.     Gently wash your baby's penis with plain, warm water after each diaper change, and pat it dry.  "Do not use soap. Don't use hydrogen peroxide or alcohol. They can slow healing.     Do not try to remove the film that forms on the penis. The film will go away on its own.     Put plenty of petroleum jelly (such as Vaseline) on the circumcision area during each diaper change. This will prevent your baby's penis from sticking to the diaper while it heals.     Fasten your baby's diapers loosely so that there is less pressure on the penis while it heals.   Follow-up care is a key part of your child's treatment and safety. Be sure to make and go to all appointments, and call your doctor if your child is having problems. It's also a good idea to know your child's test results and keep a list of the medicines your child takes.  When should you call for help?   Call your doctor now or seek immediate medical care if:    Your baby has a fever over 100.4 F.     Your baby is extremely fussy or irritable, has a high-pitched cry, or refuses to eat.     Your baby does not have a wet diaper within 12 hours after the circumcision.     You find a spot of bleeding larger than a 2-inch [place] from the incision.     Your baby has signs of infection. Signs may include severe swelling; redness; a red streak on the shaft of the penis; or a thick, yellow discharge.   Watch closely for changes in your child's health, and be sure to contact your doctor if:    A Plastibell device was used for the circumcision and the ring has not fallen off after 10 to 12 days.   Where can you learn more?  Go to https://www.[employer].net/patiented  Enter S255 in the search box to learn more about \"Circumcision in Infants: What to Expect at Home.\"  Current as of: October 24, 2023               Content Version: 14.0    5290-9921 [employer].   Care instructions adapted under license by your healthcare professional. If you have questions about a medical condition or this instruction, always ask your healthcare professional. [employer] " Incorporated disclaims any warranty or liability for your use of this information.

## 2024-07-26 ENCOUNTER — PRENATAL OFFICE VISIT (OUTPATIENT)
Dept: OBGYN | Facility: CLINIC | Age: 29
End: 2024-07-26
Payer: COMMERCIAL

## 2024-07-26 VITALS
SYSTOLIC BLOOD PRESSURE: 98 MMHG | WEIGHT: 132 LBS | DIASTOLIC BLOOD PRESSURE: 68 MMHG | OXYGEN SATURATION: 100 % | BODY MASS INDEX: 26.66 KG/M2 | HEART RATE: 86 BPM

## 2024-07-26 DIAGNOSIS — Z34.83 ENCOUNTER FOR SUPERVISION OF OTHER NORMAL PREGNANCY IN THIRD TRIMESTER: Primary | ICD-10-CM

## 2024-07-26 LAB
HGB BLD-MCNC: 11.4 G/DL (ref 11.7–15.7)
HOLD SPECIMEN: NORMAL

## 2024-07-26 PROCEDURE — 99207 PR PRENATAL VISIT: CPT | Performed by: OBSTETRICS & GYNECOLOGY

## 2024-07-26 PROCEDURE — 36415 COLL VENOUS BLD VENIPUNCTURE: CPT | Performed by: OBSTETRICS & GYNECOLOGY

## 2024-07-26 PROCEDURE — 87653 STREP B DNA AMP PROBE: CPT | Performed by: OBSTETRICS & GYNECOLOGY

## 2024-07-26 ASSESSMENT — PATIENT HEALTH QUESTIONNAIRE - PHQ9: SUM OF ALL RESPONSES TO PHQ QUESTIONS 1-9: 8

## 2024-07-26 NOTE — PROGRESS NOTES
36w4d  MFM US 7/22: cephalic, EFW 30% (5lb 12oz) with AC 73%. Shortened femur <1%.   SCE: 1/long/-3/med/post  GBS collected  Active fetal movement. No leaking or bleeding. No contractions.   Considering IOL but not sure yet.  RTC weekly, sooner PRN.  Dawna Chatterjee MD

## 2024-07-27 LAB — GP B STREP DNA SPEC QL NAA+PROBE: NEGATIVE

## 2024-08-01 ENCOUNTER — PRENATAL OFFICE VISIT (OUTPATIENT)
Dept: OBGYN | Facility: CLINIC | Age: 29
End: 2024-08-01
Payer: COMMERCIAL

## 2024-08-01 VITALS
SYSTOLIC BLOOD PRESSURE: 110 MMHG | DIASTOLIC BLOOD PRESSURE: 71 MMHG | HEART RATE: 98 BPM | BODY MASS INDEX: 26.86 KG/M2 | WEIGHT: 133 LBS | TEMPERATURE: 97.6 F

## 2024-08-01 DIAGNOSIS — Z34.83 ENCOUNTER FOR SUPERVISION OF OTHER NORMAL PREGNANCY IN THIRD TRIMESTER: Primary | ICD-10-CM

## 2024-08-01 PROCEDURE — 99207 PR PRENATAL VISIT: CPT | Performed by: OBSTETRICS & GYNECOLOGY

## 2024-08-01 NOTE — PROGRESS NOTES
37w3d   Here with  and 2 sons.    Feeling well. Baby moves all the time. Tired.    Overall weight gain still below, but has been eating more often and gained some weight.   GBS negative, hgb 11.4   Struggling with insomnia, can't go back to sleep after she gets up  the the bathroom.    discussed normal expectations in 3rd tri.  discussed IOL at or after 39 wks.  With 2 little children they are thinking this would be easier.  Plan 8/16/24, scheduled.  RR

## 2024-08-08 ENCOUNTER — PRENATAL OFFICE VISIT (OUTPATIENT)
Dept: OBGYN | Facility: CLINIC | Age: 29
End: 2024-08-08
Payer: COMMERCIAL

## 2024-08-08 VITALS
TEMPERATURE: 97.3 F | BODY MASS INDEX: 27.27 KG/M2 | HEART RATE: 83 BPM | SYSTOLIC BLOOD PRESSURE: 103 MMHG | WEIGHT: 135 LBS | DIASTOLIC BLOOD PRESSURE: 67 MMHG

## 2024-08-08 DIAGNOSIS — Z34.83 ENCOUNTER FOR SUPERVISION OF OTHER NORMAL PREGNANCY IN THIRD TRIMESTER: Primary | ICD-10-CM

## 2024-08-08 PROCEDURE — 99207 PR PRENATAL VISIT: CPT | Performed by: OBSTETRICS & GYNECOLOGY

## 2024-08-08 NOTE — PROGRESS NOTES
38w3d  More contractions this week but nothing regular or painful. Increased mucus discharge. No leaking or bleeding.   SCE: 1.5/50/-1/soft/post  Scheduled eIOL next week.  Dawna Chatterjee MD

## 2024-08-11 ENCOUNTER — HOSPITAL ENCOUNTER (INPATIENT)
Facility: CLINIC | Age: 29
LOS: 1 days | Discharge: HOME HEALTH | End: 2024-08-13
Attending: OBSTETRICS & GYNECOLOGY | Admitting: OBSTETRICS & GYNECOLOGY
Payer: COMMERCIAL

## 2024-08-11 PROCEDURE — G0463 HOSPITAL OUTPT CLINIC VISIT: HCPCS

## 2024-08-11 ASSESSMENT — ACTIVITIES OF DAILY LIVING (ADL): ADLS_ACUITY_SCORE: 20

## 2024-08-12 LAB
ABO/RH(D): NORMAL
ANTIBODY SCREEN: NEGATIVE
CRYSTALS AMN MICRO: ABNORMAL
HGB BLD-MCNC: 12.2 G/DL (ref 11.7–15.7)
SPECIMEN EXPIRATION DATE: NORMAL
T PALLIDUM AB SER QL: NONREACTIVE

## 2024-08-12 PROCEDURE — 85018 HEMOGLOBIN: CPT

## 2024-08-12 PROCEDURE — 722N000001 HC LABOR CARE VAGINAL DELIVERY SINGLE

## 2024-08-12 PROCEDURE — 120N000003 HC R&B IMCU UMMC

## 2024-08-12 PROCEDURE — 36415 COLL VENOUS BLD VENIPUNCTURE: CPT

## 2024-08-12 PROCEDURE — 86780 TREPONEMA PALLIDUM: CPT

## 2024-08-12 PROCEDURE — G0463 HOSPITAL OUTPT CLINIC VISIT: HCPCS

## 2024-08-12 PROCEDURE — 250N000011 HC RX IP 250 OP 636

## 2024-08-12 PROCEDURE — 86900 BLOOD TYPING SEROLOGIC ABO: CPT

## 2024-08-12 PROCEDURE — 250N000009 HC RX 250

## 2024-08-12 PROCEDURE — 59400 OBSTETRICAL CARE: CPT | Performed by: OBSTETRICS & GYNECOLOGY

## 2024-08-12 RX ORDER — ONDANSETRON 4 MG/1
4 TABLET, ORALLY DISINTEGRATING ORAL EVERY 6 HOURS PRN
Status: DISCONTINUED | OUTPATIENT
Start: 2024-08-12 | End: 2024-08-12 | Stop reason: HOSPADM

## 2024-08-12 RX ORDER — OXYTOCIN 10 [USP'U]/ML
10 INJECTION, SOLUTION INTRAMUSCULAR; INTRAVENOUS
Status: DISCONTINUED | OUTPATIENT
Start: 2024-08-12 | End: 2024-08-13 | Stop reason: HOSPADM

## 2024-08-12 RX ORDER — LIDOCAINE HYDROCHLORIDE 10 MG/ML
INJECTION, SOLUTION EPIDURAL; INFILTRATION; INTRACAUDAL; PERINEURAL
Status: DISCONTINUED
Start: 2024-08-12 | End: 2024-08-12 | Stop reason: HOSPADM

## 2024-08-12 RX ORDER — DOCUSATE SODIUM 100 MG/1
100 CAPSULE, LIQUID FILLED ORAL DAILY
Status: DISCONTINUED | OUTPATIENT
Start: 2024-08-12 | End: 2024-08-13 | Stop reason: HOSPADM

## 2024-08-12 RX ORDER — TRANEXAMIC ACID 10 MG/ML
1 INJECTION, SOLUTION INTRAVENOUS EVERY 30 MIN PRN
Status: DISCONTINUED | OUTPATIENT
Start: 2024-08-12 | End: 2024-08-12 | Stop reason: HOSPADM

## 2024-08-12 RX ORDER — BISACODYL 10 MG
10 SUPPOSITORY, RECTAL RECTAL DAILY PRN
Status: DISCONTINUED | OUTPATIENT
Start: 2024-08-12 | End: 2024-08-13 | Stop reason: HOSPADM

## 2024-08-12 RX ORDER — ACETAMINOPHEN 325 MG/1
650 TABLET ORAL EVERY 4 HOURS PRN
Status: DISCONTINUED | OUTPATIENT
Start: 2024-08-12 | End: 2024-08-13 | Stop reason: HOSPADM

## 2024-08-12 RX ORDER — MISOPROSTOL 200 UG/1
400 TABLET ORAL
Status: DISCONTINUED | OUTPATIENT
Start: 2024-08-12 | End: 2024-08-12 | Stop reason: HOSPADM

## 2024-08-12 RX ORDER — OXYTOCIN/0.9 % SODIUM CHLORIDE 30/500 ML
PLASTIC BAG, INJECTION (ML) INTRAVENOUS
Status: DISCONTINUED
Start: 2024-08-12 | End: 2024-08-12 | Stop reason: HOSPADM

## 2024-08-12 RX ORDER — OXYTOCIN 10 [USP'U]/ML
INJECTION, SOLUTION INTRAMUSCULAR; INTRAVENOUS
Status: DISCONTINUED
Start: 2024-08-12 | End: 2024-08-12 | Stop reason: HOSPADM

## 2024-08-12 RX ORDER — LOPERAMIDE HCL 2 MG
4 CAPSULE ORAL
Status: DISCONTINUED | OUTPATIENT
Start: 2024-08-12 | End: 2024-08-12 | Stop reason: HOSPADM

## 2024-08-12 RX ORDER — PROCHLORPERAZINE MALEATE 10 MG
10 TABLET ORAL EVERY 6 HOURS PRN
Status: DISCONTINUED | OUTPATIENT
Start: 2024-08-12 | End: 2024-08-12 | Stop reason: HOSPADM

## 2024-08-12 RX ORDER — NALOXONE HYDROCHLORIDE 0.4 MG/ML
0.4 INJECTION, SOLUTION INTRAMUSCULAR; INTRAVENOUS; SUBCUTANEOUS
Status: DISCONTINUED | OUTPATIENT
Start: 2024-08-12 | End: 2024-08-12 | Stop reason: HOSPADM

## 2024-08-12 RX ORDER — KETOROLAC TROMETHAMINE 30 MG/ML
30 INJECTION, SOLUTION INTRAMUSCULAR; INTRAVENOUS
Status: DISCONTINUED | OUTPATIENT
Start: 2024-08-12 | End: 2024-08-13 | Stop reason: HOSPADM

## 2024-08-12 RX ORDER — METOCLOPRAMIDE 10 MG/1
10 TABLET ORAL EVERY 6 HOURS PRN
Status: DISCONTINUED | OUTPATIENT
Start: 2024-08-12 | End: 2024-08-12 | Stop reason: HOSPADM

## 2024-08-12 RX ORDER — KETOROLAC TROMETHAMINE 30 MG/ML
30 INJECTION, SOLUTION INTRAMUSCULAR; INTRAVENOUS
Status: COMPLETED | OUTPATIENT
Start: 2024-08-12 | End: 2024-08-12

## 2024-08-12 RX ORDER — METHYLERGONOVINE MALEATE 0.2 MG/ML
200 INJECTION INTRAVENOUS
Status: DISCONTINUED | OUTPATIENT
Start: 2024-08-12 | End: 2024-08-12 | Stop reason: HOSPADM

## 2024-08-12 RX ORDER — CITRIC ACID/SODIUM CITRATE 334-500MG
30 SOLUTION, ORAL ORAL ONCE
Status: DISCONTINUED | OUTPATIENT
Start: 2024-08-12 | End: 2024-08-12 | Stop reason: HOSPADM

## 2024-08-12 RX ORDER — TRANEXAMIC ACID 10 MG/ML
1 INJECTION, SOLUTION INTRAVENOUS EVERY 30 MIN PRN
Status: DISCONTINUED | OUTPATIENT
Start: 2024-08-12 | End: 2024-08-13 | Stop reason: HOSPADM

## 2024-08-12 RX ORDER — OXYTOCIN 10 [USP'U]/ML
10 INJECTION, SOLUTION INTRAMUSCULAR; INTRAVENOUS
Status: DISCONTINUED | OUTPATIENT
Start: 2024-08-12 | End: 2024-08-12 | Stop reason: HOSPADM

## 2024-08-12 RX ORDER — NALOXONE HYDROCHLORIDE 0.4 MG/ML
0.2 INJECTION, SOLUTION INTRAMUSCULAR; INTRAVENOUS; SUBCUTANEOUS
Status: DISCONTINUED | OUTPATIENT
Start: 2024-08-12 | End: 2024-08-12 | Stop reason: HOSPADM

## 2024-08-12 RX ORDER — OXYTOCIN/0.9 % SODIUM CHLORIDE 30/500 ML
100-340 PLASTIC BAG, INJECTION (ML) INTRAVENOUS CONTINUOUS PRN
Status: DISCONTINUED | OUTPATIENT
Start: 2024-08-12 | End: 2024-08-13 | Stop reason: HOSPADM

## 2024-08-12 RX ORDER — NALBUPHINE HYDROCHLORIDE 10 MG/ML
2.5-5 INJECTION, SOLUTION INTRAMUSCULAR; INTRAVENOUS; SUBCUTANEOUS EVERY 6 HOURS PRN
OUTPATIENT
Start: 2024-08-12

## 2024-08-12 RX ORDER — CITRIC ACID/SODIUM CITRATE 334-500MG
30 SOLUTION, ORAL ORAL
Status: DISCONTINUED | OUTPATIENT
Start: 2024-08-12 | End: 2024-08-12 | Stop reason: HOSPADM

## 2024-08-12 RX ORDER — LOPERAMIDE HCL 2 MG
2 CAPSULE ORAL
Status: DISCONTINUED | OUTPATIENT
Start: 2024-08-12 | End: 2024-08-13 | Stop reason: HOSPADM

## 2024-08-12 RX ORDER — CARBOPROST TROMETHAMINE 250 UG/ML
250 INJECTION, SOLUTION INTRAMUSCULAR
Status: DISCONTINUED | OUTPATIENT
Start: 2024-08-12 | End: 2024-08-13 | Stop reason: HOSPADM

## 2024-08-12 RX ORDER — IBUPROFEN 800 MG/1
800 TABLET, FILM COATED ORAL EVERY 6 HOURS PRN
Status: DISCONTINUED | OUTPATIENT
Start: 2024-08-12 | End: 2024-08-13 | Stop reason: HOSPADM

## 2024-08-12 RX ORDER — FENTANYL CITRATE 50 UG/ML
50 INJECTION, SOLUTION INTRAMUSCULAR; INTRAVENOUS EVERY 30 MIN PRN
Status: DISCONTINUED | OUTPATIENT
Start: 2024-08-12 | End: 2024-08-12 | Stop reason: HOSPADM

## 2024-08-12 RX ORDER — MISOPROSTOL 200 UG/1
800 TABLET ORAL
Status: DISCONTINUED | OUTPATIENT
Start: 2024-08-12 | End: 2024-08-13 | Stop reason: HOSPADM

## 2024-08-12 RX ORDER — FENTANYL CITRATE-0.9 % NACL/PF 10 MCG/ML
100 PLASTIC BAG, INJECTION (ML) INTRAVENOUS EVERY 5 MIN PRN
OUTPATIENT
Start: 2024-08-12

## 2024-08-12 RX ORDER — MISOPROSTOL 200 UG/1
400 TABLET ORAL
Status: DISCONTINUED | OUTPATIENT
Start: 2024-08-12 | End: 2024-08-13 | Stop reason: HOSPADM

## 2024-08-12 RX ORDER — CARBOPROST TROMETHAMINE 250 UG/ML
250 INJECTION, SOLUTION INTRAMUSCULAR
Status: DISCONTINUED | OUTPATIENT
Start: 2024-08-12 | End: 2024-08-12 | Stop reason: HOSPADM

## 2024-08-12 RX ORDER — LOPERAMIDE HCL 2 MG
2 CAPSULE ORAL
Status: DISCONTINUED | OUTPATIENT
Start: 2024-08-12 | End: 2024-08-12 | Stop reason: HOSPADM

## 2024-08-12 RX ORDER — IBUPROFEN 800 MG/1
800 TABLET, FILM COATED ORAL
Status: DISCONTINUED | OUTPATIENT
Start: 2024-08-12 | End: 2024-08-13 | Stop reason: HOSPADM

## 2024-08-12 RX ORDER — OXYTOCIN/0.9 % SODIUM CHLORIDE 30/500 ML
340 PLASTIC BAG, INJECTION (ML) INTRAVENOUS CONTINUOUS PRN
Status: DISCONTINUED | OUTPATIENT
Start: 2024-08-12 | End: 2024-08-13 | Stop reason: HOSPADM

## 2024-08-12 RX ORDER — PROCHLORPERAZINE 25 MG
25 SUPPOSITORY, RECTAL RECTAL EVERY 12 HOURS PRN
Status: DISCONTINUED | OUTPATIENT
Start: 2024-08-12 | End: 2024-08-12 | Stop reason: HOSPADM

## 2024-08-12 RX ORDER — METOCLOPRAMIDE HYDROCHLORIDE 5 MG/ML
10 INJECTION INTRAMUSCULAR; INTRAVENOUS EVERY 6 HOURS PRN
Status: DISCONTINUED | OUTPATIENT
Start: 2024-08-12 | End: 2024-08-12 | Stop reason: HOSPADM

## 2024-08-12 RX ORDER — OXYTOCIN/0.9 % SODIUM CHLORIDE 30/500 ML
340 PLASTIC BAG, INJECTION (ML) INTRAVENOUS CONTINUOUS PRN
Status: DISCONTINUED | OUTPATIENT
Start: 2024-08-12 | End: 2024-08-12 | Stop reason: HOSPADM

## 2024-08-12 RX ORDER — ONDANSETRON 2 MG/ML
4 INJECTION INTRAMUSCULAR; INTRAVENOUS EVERY 6 HOURS PRN
Status: DISCONTINUED | OUTPATIENT
Start: 2024-08-12 | End: 2024-08-12 | Stop reason: HOSPADM

## 2024-08-12 RX ORDER — MISOPROSTOL 200 UG/1
800 TABLET ORAL
Status: DISCONTINUED | OUTPATIENT
Start: 2024-08-12 | End: 2024-08-12 | Stop reason: HOSPADM

## 2024-08-12 RX ORDER — LOPERAMIDE HCL 2 MG
4 CAPSULE ORAL
Status: DISCONTINUED | OUTPATIENT
Start: 2024-08-12 | End: 2024-08-13 | Stop reason: HOSPADM

## 2024-08-12 RX ORDER — MODIFIED LANOLIN
OINTMENT (GRAM) TOPICAL
Status: DISCONTINUED | OUTPATIENT
Start: 2024-08-12 | End: 2024-08-13 | Stop reason: HOSPADM

## 2024-08-12 RX ORDER — MISOPROSTOL 200 UG/1
TABLET ORAL
Status: DISCONTINUED
Start: 2024-08-12 | End: 2024-08-12 | Stop reason: HOSPADM

## 2024-08-12 RX ORDER — IBUPROFEN 800 MG/1
800 TABLET, FILM COATED ORAL
Status: COMPLETED | OUTPATIENT
Start: 2024-08-12 | End: 2024-08-12

## 2024-08-12 RX ORDER — METHYLERGONOVINE MALEATE 0.2 MG/ML
200 INJECTION INTRAVENOUS
Status: DISCONTINUED | OUTPATIENT
Start: 2024-08-12 | End: 2024-08-13 | Stop reason: HOSPADM

## 2024-08-12 RX ORDER — FENTANYL/ROPIVACAINE/NS/PF 2MCG/ML-.1
PLASTIC BAG, INJECTION (ML) EPIDURAL
OUTPATIENT
Start: 2024-08-12

## 2024-08-12 RX ORDER — HYDROCORTISONE 25 MG/G
CREAM TOPICAL 3 TIMES DAILY PRN
Status: DISCONTINUED | OUTPATIENT
Start: 2024-08-12 | End: 2024-08-13 | Stop reason: HOSPADM

## 2024-08-12 RX ADMIN — KETOROLAC TROMETHAMINE 30 MG: 30 INJECTION, SOLUTION INTRAMUSCULAR at 11:15

## 2024-08-12 RX ADMIN — Medication 340 ML/HR: at 10:54

## 2024-08-12 RX ADMIN — FENTANYL CITRATE 50 MCG: 50 INJECTION INTRAMUSCULAR; INTRAVENOUS at 09:53

## 2024-08-12 ASSESSMENT — ACTIVITIES OF DAILY LIVING (ADL)
ADLS_ACUITY_SCORE: 20

## 2024-08-12 NOTE — PROVIDER NOTIFICATION
08/12/24 0755   Provider Notification   Provider Name/Title Dr. Rodriguez, Dr. Abraham   Method of Notification At Bedside   Request Evaluate in Person   Notification Reason Status Update;Labor Status     Dr. Mccormack to bedside to evaluate patient status. Pt currently in tub, on IA. RN updating MD on regular contractions every 2-3 minutes, and pt reports of vaginal pressure that gets more intense with contractions. RN to update MD with pt progress/changes.

## 2024-08-12 NOTE — L&D DELIVERY NOTE
Delivery Summary    Sailaja Mcneal MRN# [id number]   Age: 28 year old YOB: 1995     ASSESSMENT & PLAN: Vaginal Delivery Summary    39w0d arrived with PROM  at 2100, GBS neg. Progressed into spontaneous labor without pain medication and was complete. Pushed for about one minute to deliver baby.           Pushed to deliver viable female infant on 2024 at 10:51 with spontaneous cry.  Nuchal cord reduced.  Anterior shoulder delivered with ease followed by posterior shoulder.   Put on mother's chest where delayed cord clamping was done.    Placenta spontaneous with controlled traction on the cord, intact, 3 vessels and Pitocin IV given with good tone.       No lacerations or repairs.  Mother and infant in stable condition.  No complications.    LAKSHMI ABRAHAM MD         Elizabet, Female-Sailaja [[id number]]      Labor Event Times      Latent labor onset date/time: 2024    Active labor onset date: 24 Onset time: 10:10 AM   Dilation complete date: 24 Complete time: 10:45 AM   Start pushing date/time: 2024 1045          Labor Length      1st Stage (hrs): 0 (min): 35   2nd Stage (hrs): 0 (min): 6   3rd Stage (hrs): 0 (min): 13          Labor Events     labor?: No   steroids: None  Labor Type: Spontaneous  Predominate monitoring during 1st stage: intermittent auscultation, continuous electronic fetal monitoring     Antibiotics received during labor?: No       Rupture date/time: 24   Rupture type: Spontaneous Rupture of Membranes  Fluid color: Clear, Pink  Fluid odor: Normal     Augmentation: None       Delivery/Placenta Date and Time      Delivery Date: 24 Delivery Time: 10:51 AM   Placenta Date/Time: 2024 11:04 AM  Oxytocin given at the time of delivery: after delivery of baby  Delivering clinician: Lakshmi Abraham MD   Other personnel present at delivery:  Provider Role   Trisha Briseno, RN Delivery Nurse   Nehal,  "Sara RN Registered Nurse             Vaginal Counts       Initial count performed by 2 team members:  Two Team Members   Dr. APRIL Briseno RN         Needles Suture Needles Sponges (RETIRED) Instruments   Initial counts 2  5    Added to count 0 0 0    Relief counts       Final counts 2  5            Placed during labor Accounted for at the end of labor   FSE No NA   IUPC No NA   Cervidil No NA                  Final count performed by 2 team members:  Two Team Members   Dr. APRIL Briseno RN      Final count correct?: Yes  Pre-Birth Team Brief: Complete  Post-Birth Team Debrief: Complete       Apgars    Living status: Living   1 Minute 5 Minute 10 Minute 15 Minute 20 Minute   Skin color: 1  1       Heart rate: 2  2       Reflex irritability: 2  2       Muscle tone: 2  2       Respiratory effort: 1  2       Total: 8  9       Apgars assigned by: TERESA BRISENO RN       Cord      Vessels: 3 Vessels    Cord Complications: Nuchal   Nuchal Intervention: reduced         Nuchal cord description: loose nuchal cord         Cord Blood Disposition: Lab    Gases Sent?: No    Delayed cord clamping?: Yes    Cord Clamping Delay (seconds):  seconds    Stem cell collection?: No           [place] Resuscitation    Methods: None  [place] Care at Delivery: Vigorous female infant, dried, placed mom's abdomen.   Output in Delivery Room: Voided       [place] Measurements      Weight: 6 lb 4.2 oz Length: 1' 7\"     Head circumference: 33 cm    Output in delivery room: Voided       Skin to Skin and Feeding Plan      Skin to skin initiation date/time: 1841    Skin to skin with: Mother  Skin to skin end date/time: 1841           Delivery (Maternal) (Provider to Complete) (002707)    Episiotomy: None  Repair suture: None  Genital tract inspection done: Pos       Blood Loss  Mother: Sailaja Mcneal #[id number]     Start of Mother's Information      Delivery Blood Loss  24 1010 - 24 1339      " Delivery QBL (mL) Hospital Encounter 212 mL    Postpartum QBL (mL) Hospital Encounter 13 mL    Total  225 mL               End of Mother's Information  Mother: Sailaja Mcneal #[id number]                Delivery - Provider to Complete ([id number])    Delivering clinician: Lakshmi Abraham MD  Delivery Type (Choose the 1 that will go to the Birth History): Vaginal, Spontaneous                         Other personnel:  Provider Role   Trisha Briseno RN Delivery Nurse   Sara Calvert RN Registered Nurse                    Placenta    Date/Time: 8/12/2024 11:04 AM  Removal: Expressed  Comments: cord avulsion prior to delivery  Disposition: Hospital disposal             Anesthesia    Method: INTRAVENOUS , Nitrous Oxide                    Presentation and Position    Presentation: Vertex    Position: Left Occiput Anterior                     LAKSHMI ABRAHAM MD

## 2024-08-12 NOTE — PLAN OF CARE
Data: Patient presented to [facility]: 2024 10:23 PM.  Reason for maternal/fetal assessment is leaking vaginal fluid. Patient reports SROM @ 2100. Patient denies vaginal bleeding, pelvic pressure, nausea, vomiting, headache, visual disturbances, epigastric or RUQ pain, and significant edema. Patient reports fetal movement is normal. Patient is a 39w0d . Prenatal record reviewed. Pregnancy has been uncomplicated. Support person is present.     See flowsheets for FHR tracing and uterine activity documentation. Cervix 3 cm dilated and 50% effaced. Fetal station -2. Fetal presentation cephalic per  BSUS . Membranes: fluid visually apparent externally and Fern test pending.    Vital signs wnl. Patient reports no pain and is coping.     Action: Verbal consent for EFM and IA. Triage assessment completed. Patient does not meet criteria for early labor discharge.     Response: Patient verbalized agreement with plan. Will contact Dr. Farnsworth with update and for further orders.    This writer assumed care at 0000.

## 2024-08-12 NOTE — PROGRESS NOTES
KRYSTIN MD LABOR & DELIVERY PROGRESS NOTE:   2024 6:34 AM         SUBJECTIVE:   Patient contractions much stronger, patient breathing through them. Has requested fentanyl  Contractions:  3-4/10 minutes  Leakage of fluid:  Yes since 9p  Vaginal bleeding:  No  Pain controlled:  Requested fentanyl, doesn't desire epidural           OBJECTIVE:     Vitals:    24 0740 24 0830 24 0856 24 0903   BP:  93/71     BP Location:  Left arm     Patient Position:  Standing     Cuff Size:  Adult Regular     Pulse:  114  90   Resp: 18 20     Temp: 97.5  F (36.4  C)  97.9  F (36.6  C)    TempSrc: Oral  Oral          NST:  Fetal Heart Rate Tracin bpm baseline, mod variability, + accels, one deep variable to 60 while on back.     Gen: alert, breathing through ctx  Abdomen:  gravid, nontender  Cervix: 6.5/80/-2 at 1017         LABS:     Recent Results (from the past 12 hour(s))   Fern Test for Rupture of Membranes    Collection Time: 24 11:55 PM   Result Value Ref Range    Fern Crystallization Ferning present (A) No ferning present   Hemoglobin    Collection Time: 24  1:08 AM   Result Value Ref Range    Hemoglobin 12.2 11.7 - 15.7 g/dL   Treponema Abs w Reflex to RPR and Titer    Collection Time: 24  1:08 AM   Result Value Ref Range    Treponema Antibody Total Nonreactive Nonreactive   Adult Type and Screen    Collection Time: 24  1:08 AM   Result Value Ref Range    ABO/RH(D) O POS     Antibody Screen Negative Negative    SPECIMEN EXPIRATION DATE [id number]               ASSESSMENT / PLAN:   28 year old  at 39w0d admitted with PROM, now in spontaneous labor, transitioned to active labor. In more pain and requested fentanyl for pain management. Uncomplicated pregnancy.    Labor: expectant management  Fetal well being: Majority category 1 tracing. Now doing intermittent auscultation. EFW 6.5-7lb  GBS: neg  Pain: expectant, hoping to avoid epidural. Had one dose of  fentanyl, helped mildly.  Anticipate .    Allen Rodriguez MD  Obstetrics and Gynecology, PGY-2  2024 10:19 AM

## 2024-08-12 NOTE — PLAN OF CARE
VSS, afebrile. Denies pre-e symptoms. Denies any bleeding. Patient on intermittent auscultation. Support person Ashlyaubree is present at the bedside. Comfort measures include breathing/relaxation, swaying, birthing ball, and significant other. Anticipate , patient planning unmedicated delivery. Patient aware to call nursing with any questions, concerns, and progression in labor. Call light within reach. Report given to WILLIAM Rico.

## 2024-08-12 NOTE — PLAN OF CARE
Problem: Adult Inpatient Plan of Care  Goal: Plan of Care Review  Description: The Plan of Care Review/Shift note should be completed every shift.  The Outcome Evaluation is a brief statement about your assessment that the patient is improving, declining, or no change.  This information will be displayed automatically on your shift  note.  Outcome: Progressing  Flowsheets (Taken 8/12/2024 1838)  Plan of Care Reviewed With: patient  Overall Patient Progress: improving  Goal: Optimal Comfort and Wellbeing  Outcome: Progressing     Problem: Postpartum (Vaginal Delivery)  Goal: Hemostasis  8/12/2024 1838 by Angle Pan, RN  Outcome: Progressing  8/12/2024 1405 by Angle Pan, RN  Outcome: Progressing  Goal: Optimal Pain Control and Function  Outcome: Progressing  Goal: Effective Urinary Elimination  Outcome: Progressing   Goal Outcome Evaluation:      Plan of Care Reviewed With: patient    Overall Patient Progress: improvingOverall Patient Progress: improving     VSS and postpartum assessments WDL.  Up ad vivek with steady gait and independent with cares.  Bonding well with infant.  Breastfeeding on cue.  Pain managed with tylenol and ibuprofen as needed.  Spouse and children present and supportive.  Will continue with postpartum cares and education per plan of care.

## 2024-08-12 NOTE — PROGRESS NOTES
Brief Note    Alerted to patient's complaint of increased rectal pressure. Patient up ambulating to manage contractions. No acute concerns.    SVE: /-2    Patient progressing spontaneously. Anticipate .    Jeri Farnsworth MD  Ob/Gyn Resident, PGY-3  2024 8:13 AM

## 2024-08-12 NOTE — PLAN OF CARE
Report received from Trisha THOMAS at 1155. Writer then assumed care of pt and infant. Pt VSS, fundus firm and bleeding is light. Pt was able to get up to bathroom and voided x1 for 200ml.     Report given to Angle THOMAS. Pt transferred to [facility] at 1315. Bands checked.     Transfer complete.

## 2024-08-12 NOTE — H&P
OB History and Physical     Sailaja Mcneal    MRN# [id number]  YOB: 1995      HPI: Sailaja Mcneal is a 28 year old  at 39w0d by LMP c/w 9w3d US who presents today with concern of rupture of membranes. She states she first felt a gush of fluid around 9 PM. With additional smaller gushes intermittently with movement thereafter. She reports good fetal movement. Feels abdominal tightening 2x/hr. Denies vaginal bleeding.  She denies SOB, chest pain, palpitations, N/V, LE swelling/tenderness.  No concerns for headache, vision changes, RUQ or epigastric pain. Her previous pregnancies were uncomplicated, however, states her first son was born with a heart defect. Her deliveries were uncomplicated. Denies history of postpartum hemorrhage, shoulder dystocia, pre-eclampsia. No history of asthma or high blood pressure.  No other acute concerns.    Pregnancy notable for:   Rubella NI  History of child with VSD    Prenatal Labs:   Lab Results   Component Value Date    AS Negative 2024    HEPBANG Nonreactive 2024    CHPCRT Negative 10/20/2022    GCPCRT Negative 10/20/2022    HGB 11.4 (L) 2024       GBS Status:   Lab Results   Component Value Date    GBS Negative 2020         OBHX:   OB History    Para Term  AB Living   5 2 2 0 2 2   SAB IAB Ectopic Multiple Live Births   2 0 0 0 2      # Outcome Date GA Lbr Edu/2nd Weight Sex Type Anes PTL Lv   5 Current            4 SAB 2023           3 SAB 2023           2 Term 20 40w3d 07:23 / 00:06 3.6 kg (7 lb 15 oz) M Vag-Spont None N MARISOL      Name: DHIRAJ,BABY BOY      Apgar1: 8  Apgar5: 9   1 Term 2019 40w0d    Vag-Spont   MARISOL       MedicalHX:   Past Medical History:   Diagnosis Date    Gastroesophageal reflux disease     Varicella        SurgicalHX:   Past Surgical History:   Procedure Laterality Date    COMBINED ESOPHAGOSCOPY, GASTROSCOPY, DUODENOSCOPY (EGD) WITH CO2 INSUFFLATION N/A 2022    Procedure:  ESOPHAGOGASTRODUODENOSCOPY, WITH CO2 INSUFFLATION;  Surgeon: Richard Benitez DO;  Location: [facility]    ESOPHAGOSCOPY, GASTROSCOPY, DUODENOSCOPY (EGD), COMBINED N/A 2022    Procedure: ESOPHAGOGASTRODUODENOSCOPY, WITH BIOPSY;  Surgeon: Richard Benitez DO;  Location: [facility]       Medications:   No current facility-administered medications for this encounter.       Allergies:  No Known Allergies    FamilyHX:    Family History   Problem Relation Age of Onset    No Known Problems Mother     No Known Problems Father     Cholelithiasis Sister     Heart Defect Son        SocialHX:   Social History     Socioeconomic History    Marital status:    Tobacco Use    Smoking status: Never    Smokeless tobacco: Never   Substance and Sexual Activity    Alcohol use: Yes    Drug use: Never    Sexual activity: Yes     Partners: Male   Other Topics Concern    Parent/sibling w/ CABG, MI or angioplasty before 65F 55M? No       ROS: negative other than above    Physical Exam:  Patient Vitals for the past 24 hrs:   BP Temp Temp src   24 0000 -- 98.4  F (36.9  C) Oral   24 2230 115/70 98.2  F (36.8  C) Oral     General: NAD, appears generally well  CV: RRR, normal S1/S2, no m/r/g   Lungs: CTAB, non-labored breathing, no wheezes, rales, or rhonchi  Abdomen: soft, gravid, non-tender, EFW 6#; Cephalic by BSUS  SSE: Mixed mucoid and clear discharge in the vagina, ferning swab collected  SVE: 3/50/-2  Extremities: Non-tender with trace edema bilaterally in LE    FHT: 135 bpm baseline, moderate variability,  accels present, no decels   [place]: 0 contractions in ten minutes    Labs:   RPR  T&S  CBC    Assessment & Plan: 28 year old  at 39w0d by 9w3d  US, here for evaluation of rupture of membranes. Fluid seen on speculum exam confirmed to be amniotic fluid by ferning test. Patient is elects for expectant management.Pregnancy notable for:    Rubella NI  History of child with VSD    # PROM  - Admit to labor and  delivery  - Observe for spontaneous progress, consider pitocin for augmentation   - SVE prn; Cervical exam on admission: 3/50/-2   - Membranes: SROM, confirmed via fern  - Labs: CBC, T&S, RPR  - GBS neg; Antibiotics not indicated   - Pain Control: Per patient request  - Diet: Regular in early labor. NPO once on pitocin or in active labor  - FWB: Cat 1 tracing, reactive; cephalic by BSUS; EFW 6#  - Intermittent auscultation as appropriate  - PPH Prophylaxis: Standard    # PNC  - Rh positive, Rubella no-immune, GCT elevated, GTT wnl, GBS neg  - Other prenatal labs wnl  - Imaging: Posterior placenta, normal fetal anatomy    # FWB:   Cat 1 tracing, reactive; cephalic by BSUS; EFW 6#  - Intermittent auscultation as appropriate  - Intrauterine resuscitative measures prn      Patient discussed with Dr. Shena Salazar, MS3    I saw and evaluated the patient with Ruth Ann Salazar MS3. I have read and edited the documentation above and I agree with the assessment and plan as stated.     Jeri Farnsworth MD  Ob/Gyn Resident, PGY-3  2024         Physician Attestation   I personally examined and evaluated this patient.  I discussed the patient with the resident/fellow and care team, and agree with the assessment and plan of care as documented in the note.     Key findings: 28 year old  at 39w0d with PROM in early spontaneous labor. Uncomplicated pregnancy. Category 1 tracing. Expectant labor management. Anticipate .    Please see A&P for additional details of medical decision making.      Dawna Chatterjee MD  Date of Service (when I saw the patient): 24

## 2024-08-12 NOTE — DISCHARGE SUMMARY
[facility] Discharge Summary    Sailaja Mcneal MRN# [id number]   Age: 28 year old YOB: 1995     Date of Admission:  2024  Date of Discharge::  24  Admitting Physician:  Dawna Chatterjee MD  Discharge Physician:  Becki Nicole MD          Admission Diagnoses:   - IUP at 39w0d  - Rubella nonimmune  - Hx of child with VSD          Discharge Diagnosis:     - IUP at 39w0d, now s/p   - Same as above           Procedures:     Procedure(s): -           Medications Prior to Admission:     Medications Prior to Admission   Medication Sig Dispense Refill Last Dose    Prenatal Vit-DSS-Fe Cbn-FA (PRENATAL AD PO)                 Discharge Medications:        Review of your medicines        START taking        Dose / Directions   acetaminophen 325 MG tablet  Commonly known as: TYLENOL  Used for:  (normal spontaneous vaginal delivery)      Dose: 650 mg  Take 2 tablets (650 mg) by mouth every 6 hours as needed for mild pain Start after Delivery.  Quantity: 100 tablet  Refills: 0     ibuprofen 600 MG tablet  Commonly known as: ADVIL/MOTRIN  Used for:  (normal spontaneous vaginal delivery)      Dose: 600 mg  Take 1 tablet (600 mg) by mouth every 6 hours as needed for moderate pain Start after delivery  Quantity: 60 tablet  Refills: 0     senna-docusate 8.6-50 MG tablet  Commonly known as: SENOKOT-S/PERICOLACE  Used for:  (normal spontaneous vaginal delivery)      Dose: 1 tablet  Take 1 tablet by mouth daily Start after delivery.  Quantity: 100 tablet  Refills: 0            CONTINUE these medicines which have NOT CHANGED        Dose / Directions   PRENATAL AD PO      Refills: 0               Where to get your medicines        These medications were sent to [facility] [address]  [address] [address]      Phone: [phone]   acetaminophen 325 MG tablet  ibuprofen 600 MG tablet  senna-docusate 8.6-50 MG  tablet               Consultations:   - Lactation           Brief Admission History   Sailaja Mcneal is a 28 year old  at 39w0d by LMP c/w 9w3d US who presented with concern of rupture of membranes. She states she first felt a gush of fluid around 9 PM with additional smaller gushes intermittently with movement thereafter. She reports good fetal movement. Feels abdominal tightening 2x/hr. Denies vaginal bleeding.  She denies SOB, chest pain, palpitations, N/V, LE swelling/tenderness.  No concerns for headache, vision changes, RUQ or epigastric pain.          Brief Intrapartum Course:   Her cervical exam on admission was 350/-2. She was managed expectantly and progressed to complete without augmentation. She pushed for one minute and ultimately delivered a vigorous female infant with Apgars 8/9 at 1 and 5 minutes, weight 2840g. IV pitocin was started. Placenta delivered with gentle cord traction without complications.  mL.            Hospital Course:   The patient's hospital course was unremarkable.  On discharge, her pain was well controlled. Vaginal bleeding is similar to peak menstrual flow.  Voiding without difficulty.  Ambulating well and tolerating a normal diet.  No fever.  Breastfeeding well.  Infant is stable.  No bowel movement yet but has passed flatus. She was discharged on post-partum day #1    Post-partum hemoglobin: 11.8          Discharge Instructions and Follow-Up:     Discharge diet: Regular   Discharge activity: Pelvic rest for 6 weeks including no sexual intercourse, tampons, or douching.    Discharge follow-up: Follow up with your primary OB for a routine postpartum visit in 6 weeks           Discharge Disposition:     Discharged to home in stable condition      Allen Rodriguez MD  Obstetrics and Gynecology, PGY-2  2024 6:58 AM

## 2024-08-12 NOTE — PLAN OF CARE
Data: Patient admitted to room 479 at 0000. Patient is a . Prenatal record reviewed.   OB History    Para Term  AB Living   5 2 2 0 2 2   SAB IAB Ectopic Multiple Live Births   2 0 0 0 2      # Outcome Date GA Lbr Edu/2nd Weight Sex Type Anes PTL Lv   5 Current            4 SAB 2023           3 SAB 2023           2 Term 20 40w3d 07:23 / 00:06 3.6 kg (7 lb 15 oz) M Vag-Spont None N MARISOL      Name: DHIRAJ,BABY BOY      Apgar1: 8  Apgar5: 9   1 Term 2019 40w0d    Vag-Spont   MARISOL   .  Medical History:   Past Medical History:   Diagnosis Date    Gastroesophageal reflux disease     Varicella    .  Gestational age 39w0d. Vital signs per doc flowsheet. Fetal movement present. Patient reports Rule out rupture of membranes   as reason for admission. Support person Calli is present.  Action: Report from WILLIAM Tesfaye obtained at 2345. Care of patient assumed at 0000. Verbal consent for IA. Admission assessment completed. Patient and support persons educated on labor process. Patient instructed to report change in fetal movement, contractions, bleeding, abdominal pain, or any concerns related to the pregnancy to her nurse/physician. Patient oriented to room, call light in reach.   Response: Dr. Farnsworth informed of inpatient room for admission. Plan per provider is allow patient to spontaneously labor. Patient verbalized understanding of education and verbalized agreement with plan. Patient coping with labor via breathing/relaxation, breathing ball, and support person.

## 2024-08-12 NOTE — PROGRESS NOTES
[facility] PROGRESS NOTE:   2024 6:34 AM         SUBJECTIVE:   Patient complains of stronger contractions and pressure.    Contractions:  q 3 minutes  Leakage of fluid:  Yes since 9p  Vaginal bleeding:  No  Pain controlled:  Yes           OBJECTIVE:     Vitals:    24 0231 24 0330 24 0430 24 0530   BP: 103/70      BP Location: Left arm      Patient Position: Sitting      Cuff Size: Adult Regular      Resp: 18      Temp: 98.2  F (36.8  C) 97.5  F (36.4  C) 98.1  F (36.7  C) 98.2  F (36.8  C)   TempSrc: Oral Oral Oral Oral         NST:  Fetal Heart Rate Tracin bpm baseline, mod variability, + accels, no decels  Category 1    Tocometer: q 3 minutes    Gen: alert, breathing through ctx  Abdomen:  gravid, nontender  Cervix: 5/80/-2 at 0545         LABS:     Recent Results (from the past 12 hour(s))   Fern Test for Rupture of Membranes    Collection Time: 24 11:55 PM   Result Value Ref Range    Fern Crystallization Ferning present (A) No ferning present   Hemoglobin    Collection Time: 24  1:08 AM   Result Value Ref Range    Hemoglobin 12.2 11.7 - 15.7 g/dL   Adult Type and Screen    Collection Time: 24  1:08 AM   Result Value Ref Range    ABO/RH(D) O POS     Antibody Screen Negative Negative    SPECIMEN EXPIRATION DATE [id number]               ASSESSMENT / PLAN:   28 year old  at 39w0d admitted with PROM, now in spontaneous labor, transitioning to active labor. Uncomplicated pregnancy.    Labor: expectant management  Fetal well being: Category 1 tracing. Now doing intermittent auscultation. EFW 6.5-7lb  GBS: neg  Pain: expectant, hoping to avoid epidural  Anticipate .    Dawna Chatterjee MD

## 2024-08-12 NOTE — PLAN OF CARE
RN assumed care at 0730. Pt VSS. Afebrile. On IA, see flowsheets for FHT documentation. Pt having regular contractions. Pt spouse at bedside supporting patient. RN assisting pt with position changes, using tub, nitrous. Requested and received IV fentanyl x1, placed on continuous FHT monitoring-see flowsheets for details. After feeling constant pressure, Dr. Abraham to bedside. Pt delivered viable female infant at 1051 after RN/MD coaching patient with pushing. Apgars 8 and 9 by this RN. Dried and stimulated for strong cry. Placed on mother's chest. Delivery  mL. Pitocin gtt per protocol, see MAR. Initial fundus/bleeding checked by MD Abraham and verified by this RN. In recovery fundus firm, bleeding WNL. See flowsheets for full assessment details/interventions. Given IV toradol, heat packs to abdomen for mild cramping. Pt and infant appear to be bonding well. RN assisting with positioning for breastfeeding. See delivery note by MD Abraham for full details. Report given to Postpartum RN Adela BROWN who assumed care at 1200.

## 2024-08-12 NOTE — PLAN OF CARE
Goal: Control pain and remain comfortable until discharge tomorrow    Problem: Postpartum (Vaginal Delivery)  Goal: Hemostasis  Outcome: Progressing  Goal: Optimal Pain Control and Function  Outcome: Progressing  Goal: Effective Urinary Elimination  Outcome: Progressing   Patient arrived to [facility] unit via wheelchair at 1300, with belongings. Accompanied by spouse and sons, with infant in arms. Received report from WILLIAM Chapman. Bands double checked. Oriented patient to room and unit. Call light given and no concerns present at this time.

## 2024-08-13 VITALS
WEIGHT: 126.3 LBS | RESPIRATION RATE: 16 BRPM | SYSTOLIC BLOOD PRESSURE: 110 MMHG | BODY MASS INDEX: 25.51 KG/M2 | TEMPERATURE: 97.5 F | DIASTOLIC BLOOD PRESSURE: 87 MMHG | HEART RATE: 74 BPM

## 2024-08-13 LAB — HGB BLD-MCNC: 11.8 G/DL (ref 11.7–15.7)

## 2024-08-13 PROCEDURE — 250N000013 HC RX MED GY IP 250 OP 250 PS 637: Performed by: OBSTETRICS & GYNECOLOGY

## 2024-08-13 PROCEDURE — 85018 HEMOGLOBIN: CPT | Performed by: OBSTETRICS & GYNECOLOGY

## 2024-08-13 PROCEDURE — 36415 COLL VENOUS BLD VENIPUNCTURE: CPT | Performed by: OBSTETRICS & GYNECOLOGY

## 2024-08-13 RX ORDER — ACETAMINOPHEN 325 MG/1
650 TABLET ORAL EVERY 6 HOURS PRN
Qty: 100 TABLET | Refills: 0 | Status: SHIPPED | OUTPATIENT
Start: 2024-08-13

## 2024-08-13 RX ORDER — IBUPROFEN 600 MG/1
600 TABLET, FILM COATED ORAL EVERY 6 HOURS PRN
Qty: 60 TABLET | Refills: 0 | Status: SHIPPED | OUTPATIENT
Start: 2024-08-13

## 2024-08-13 RX ORDER — AMOXICILLIN 250 MG
1 CAPSULE ORAL DAILY
Qty: 100 TABLET | Refills: 0 | Status: SHIPPED | OUTPATIENT
Start: 2024-08-13

## 2024-08-13 RX ADMIN — DOCUSATE SODIUM 100 MG: 100 CAPSULE, LIQUID FILLED ORAL at 08:42

## 2024-08-13 ASSESSMENT — ACTIVITIES OF DAILY LIVING (ADL)
ADLS_ACUITY_SCORE: 20

## 2024-08-13 NOTE — DISCHARGE INSTRUCTIONS
Warning Signs after Having a Baby    Keep this paper on your fridge or somewhere else where you can see it.    Call your provider if you have any of these symptoms up to 12 weeks after having your baby.    Thoughts of hurting yourself or your baby  Pain in your chest or trouble breathing  Severe headache not helped by pain medicine  Eyesight concerns (blurry vision, seeing spots or flashes of light, other changes to eyesight)  Fainting, shaking or other signs of a seizure    Call 9-1-1 if you feel that it is an emergency.     The symptoms below can happen to anyone after giving birth. They can be very serious. Call your provider if you have any of these warning signs.    My provider s phone number: _______________________    Losing too much blood (hemorrhage)    Call your provider if you soak through a pad in less than an hour or pass blood clots bigger than a golf ball. These may be signs that you are bleeding too much.    Blood clots in the legs or lungs    After you give birth, your body naturally clots its blood to help prevent blood loss. Sometimes this increased clotting can happen in other areas of the body, like the legs or lungs. This can block your blood flow and be very dangerous.     Call your provider if you:  Have a red, swollen spot on the back of your leg that is warm or painful when you touch it.   Are coughing up blood.     Infection    Call your provider if you have any of these symptoms:  Fever of 100.4 F (38 C) or higher.  Pain or redness around your stitches if you had an incision.   Any yellow, white, or green fluid coming from places where you had stitches or surgery.    Mood Problems (postpartum depression)    Many people feel sad or have mood changes after having a baby. But for some people, these mood swings are worse.     Call your provider right away if you feel so anxious or nervous that you can't care for yourself or your baby.    Preeclampsia (high blood pressure)    Even if you  didn't have high blood pressure when you were pregnant, you are at risk for the high blood pressure disease called preeclampsia. This risk can last up to 12 weeks after giving birth.     Call your provider if you have:   Pain on your right side under your rib cage  Sudden swelling in the hands and face    Remember: You know your body. If something doesn't feel right, get medical help.     For informational purposes only. Not to replace the advice of your health care provider. Copyright 2020 [facility]. All rights reserved. Clinically reviewed by Kia Leal, RNC-OB, MSN. [employer] [id number] - Rev 02/23.

## 2024-08-13 NOTE — PLAN OF CARE
Vital signs within normal limits. Postpartum checks within normal limits - see flow record. Patient eating and drinking normally. Patient able to empty bladder independently and is up ambulating. No apparent signs of infection.  Patient performing self cares and is able to care for infant.  Patient declined pain medication, patient stated she was comfortable. Patient education done about breastfeeding.   Positive attachment behaviors observed with infant. Significant other by bedside

## 2024-08-13 NOTE — PLAN OF CARE
VSS and postpartum assessments WDL.  Up ad vivek with steady gait and independent with cares.  Bonding well with infant.  Breastfeeding on cue, having some bilateral nipple pain, hydrogels applied with relief. Educated on deeper latch techniques and patient indicated this was more comfortable.  Pain managed without pain medication.  Spouse and sons present and supportive.  Reviewed discharge medications.  Reviewed follow-up for appointment in 6 weeks.  Reviewed discharge instructions and answered all questions.  Discharged home with infant and all belongings at 1330.

## 2024-08-13 NOTE — PROGRESS NOTES
[facility]   Post-partum Progress Note    Name:  Sailaja Mcneal  MRN: [id number]    S: Patient is resting comfortably.  Pain is minimal and well controlled.  Lochia similar to menses.  Has passed flatus, is voiding spontaneously.  Ambulating without dizziness/lightheadedness.  Tolerating a regular diet without nausea/vomiting. Breast feeding without difficulty. Plans discharge today.    O:   Patient Vitals for the past 24 hrs:   BP Temp Temp src Pulse Resp   08/13/24 0508 111/80 97.8  F (36.6  C) Oral 63 16   08/13/24 0150 100/77 98.2  F (36.8  C) Oral 64 16   08/12/24 2040 101/65 97.8  F (36.6  C) Oral 67 16   08/12/24 1637 102/63 98.1  F (36.7  C) Oral 80 16   08/12/24 1321 115/81 98.2  F (36.8  C) Oral 62 16   08/12/24 1259 108/70 98  F (36.7  C) Oral -- 16   08/12/24 1244 99/58 -- -- -- 16   08/12/24 1229 106/65 -- -- -- 16   08/12/24 1214 102/60 98.6  F (37  C) Oral -- 16   08/12/24 1200 112/69 -- -- -- --   08/12/24 1144 111/68 -- -- -- --   08/12/24 1130 -- 98.4  F (36.9  C) Oral -- --   08/12/24 1125 114/69 -- -- -- --   08/12/24 1105 102/62 -- -- -- --   08/12/24 1047 -- 97.3  F (36.3  C) Oral -- --   08/12/24 1010 99/62 -- -- -- --   08/12/24 0903 -- -- -- 90 --   08/12/24 0856 -- 97.9  F (36.6  C) Oral -- --   08/12/24 0830 93/71 -- -- 114 20   08/12/24 0740 -- 97.5  F (36.4  C) Oral -- 18   08/12/24 0714 102/66 -- -- -- 18     Gen:  Resting comfortably, NAD  CV:  RR, well perfused  Pulm:  Non-labored breathing on room air  Abd:  Soft, appropriately ttp, non-distended.Fundus below umbilicus, firm and non-tender.   Ext:  non-tender, trace edema to bilateral lower extremities    I/O last 3 completed shifts:  In: -   Out: 625 [Urine:400; Blood:225]    Hgb:   Hemoglobin   Date Value Ref Range Status   08/12/2024 12.2 11.7 - 15.7 g/dL Final   11/05/2020 11.6 (L) 11.7 - 15.7 g/dL Final     Comment:     Results confirmed by repeat test       Assessment/Plan:  Sailaja Mcneal 28 year  old  on PPD #1 s/p .    # Postpartum management  Pain: Well-controlled with ibuprofen, tylenol PRN   GI:  PRN bowel regimen, anti-emetics  : Voiding spontaneously  Hgb: Hgb 12.2 > > AM Hemoglobin pending. Will discharge with PO iron if less than 10.   Rh: Positive  Rubella: Nonimmune, plan MMR prior to discharge   Feed: Breast feeding  Infant:  Stable in room    BC: Plans IUD or vasectomy. Discussed recommended pregnancy spacing of 18 months. Will plan to discuss more at 6 week postpartum visit.   PPx:  Encourage ambulation, IS, SCDs while confined to bed    Medically Ready for Discharge: Anticipated Today    Ruth Ann Salazar, MS3     I was present with the student who participated in the service and in the documentation of the note. I have verified the history and personally performed the physical exam and medical decision-making. I agree with the assessment and plan of care as documented in the note.     Munira Dye DO, PGY-2  [facility]   2024 4:42 AM    Patient seen and examined by me, agree with above resident note. Meeting all goals and stable for discharge.  Hgb 11.8.  instructions given.  RTC 6 weeks    ARIEL SHEA MD

## 2024-09-26 ENCOUNTER — PATIENT OUTREACH (OUTPATIENT)
Dept: CARE COORDINATION | Facility: CLINIC | Age: 29
End: 2024-09-26
Payer: COMMERCIAL

## 2024-09-29 ENCOUNTER — E-VISIT (OUTPATIENT)
Dept: OBGYN | Facility: CLINIC | Age: 29
End: 2024-09-29
Payer: COMMERCIAL

## 2024-09-29 DIAGNOSIS — N89.8 VAGINAL DISCHARGE: Primary | ICD-10-CM

## 2024-09-29 PROCEDURE — 99421 OL DIG E/M SVC 5-10 MIN: CPT | Performed by: OBSTETRICS & GYNECOLOGY

## 2024-10-01 ENCOUNTER — LAB (OUTPATIENT)
Dept: LAB | Facility: CLINIC | Age: 29
End: 2024-10-01
Payer: COMMERCIAL

## 2024-10-01 DIAGNOSIS — N89.8 VAGINAL DISCHARGE: ICD-10-CM

## 2024-10-01 LAB
CLUE CELLS: ABNORMAL
TRICHOMONAS, WET PREP: ABNORMAL
WBC'S/HIGH POWER FIELD, WET PREP: ABNORMAL
YEAST, WET PREP: ABNORMAL

## 2024-10-01 PROCEDURE — 87210 SMEAR WET MOUNT SALINE/INK: CPT

## 2024-10-09 ENCOUNTER — PRENATAL OFFICE VISIT (OUTPATIENT)
Dept: OBGYN | Facility: CLINIC | Age: 29
End: 2024-10-09
Payer: COMMERCIAL

## 2024-10-09 VITALS
OXYGEN SATURATION: 96 % | WEIGHT: 113.8 LBS | SYSTOLIC BLOOD PRESSURE: 109 MMHG | HEIGHT: 59 IN | TEMPERATURE: 98.1 F | HEART RATE: 80 BPM | BODY MASS INDEX: 22.94 KG/M2 | DIASTOLIC BLOOD PRESSURE: 70 MMHG

## 2024-10-09 DIAGNOSIS — Z30.430 ENCOUNTER FOR INSERTION OF INTRAUTERINE CONTRACEPTIVE DEVICE: ICD-10-CM

## 2024-10-09 DIAGNOSIS — Z32.00 ENCOUNTER FOR PREGNANCY TEST, RESULT UNKNOWN: ICD-10-CM

## 2024-10-09 PROBLEM — Z23 NEED FOR TDAP VACCINATION: Status: RESOLVED | Noted: 2024-01-18 | Resolved: 2024-10-09

## 2024-10-09 LAB — HCG UR QL: NEGATIVE

## 2024-10-09 PROCEDURE — 58300 INSERT INTRAUTERINE DEVICE: CPT | Performed by: OBSTETRICS & GYNECOLOGY

## 2024-10-09 PROCEDURE — 99207 PR POST PARTUM EXAM: CPT | Performed by: OBSTETRICS & GYNECOLOGY

## 2024-10-09 PROCEDURE — 81025 URINE PREGNANCY TEST: CPT | Performed by: OBSTETRICS & GYNECOLOGY

## 2024-10-09 ASSESSMENT — PATIENT HEALTH QUESTIONNAIRE - PHQ9
SUM OF ALL RESPONSES TO PHQ QUESTIONS 1-9: 1
10. IF YOU CHECKED OFF ANY PROBLEMS, HOW DIFFICULT HAVE THESE PROBLEMS MADE IT FOR YOU TO DO YOUR WORK, TAKE CARE OF THINGS AT HOME, OR GET ALONG WITH OTHER PEOPLE: NOT DIFFICULT AT ALL
SUM OF ALL RESPONSES TO PHQ QUESTIONS 1-9: 1

## 2024-10-09 ASSESSMENT — ANXIETY QUESTIONNAIRES
8. IF YOU CHECKED OFF ANY PROBLEMS, HOW DIFFICULT HAVE THESE MADE IT FOR YOU TO DO YOUR WORK, TAKE CARE OF THINGS AT HOME, OR GET ALONG WITH OTHER PEOPLE?: NOT DIFFICULT AT ALL
7. FEELING AFRAID AS IF SOMETHING AWFUL MIGHT HAPPEN: SEVERAL DAYS
7. FEELING AFRAID AS IF SOMETHING AWFUL MIGHT HAPPEN: SEVERAL DAYS
6. BECOMING EASILY ANNOYED OR IRRITABLE: NOT AT ALL
IF YOU CHECKED OFF ANY PROBLEMS ON THIS QUESTIONNAIRE, HOW DIFFICULT HAVE THESE PROBLEMS MADE IT FOR YOU TO DO YOUR WORK, TAKE CARE OF THINGS AT HOME, OR GET ALONG WITH OTHER PEOPLE: NOT DIFFICULT AT ALL
GAD7 TOTAL SCORE: 4
GAD7 TOTAL SCORE: 4
1. FEELING NERVOUS, ANXIOUS, OR ON EDGE: SEVERAL DAYS
GAD7 TOTAL SCORE: 4
5. BEING SO RESTLESS THAT IT IS HARD TO SIT STILL: NOT AT ALL
4. TROUBLE RELAXING: NOT AT ALL
3. WORRYING TOO MUCH ABOUT DIFFERENT THINGS: SEVERAL DAYS
2. NOT BEING ABLE TO STOP OR CONTROL WORRYING: SEVERAL DAYS

## 2024-10-09 NOTE — PATIENT INSTRUCTIONS
After having an IUD placed it is normal to have spotting and cramping, you can take Ibuprofen or Tylenol according to the instructions on the bottle and use a heating pad to the lower abdomen as needed.     Please avoid placing anything in the vagina (tampons, intercourse, etc) for 72 hours. The progesterone IUD takes 7 days to be effective for pregnancy prevention so please use condoms for back up contraception if you have intercourse before the 7 day susanne but the copper (ParaGard IUD) is effective the same day of placement.     Please call [phone] with any severe abdominal pain, heavy vaginal bleeding, fevers or foul smelling vaginal discharge.     You should check your IUD strings in 2 weeks by placing one or two fingers inside the vagina as high up as you can reach, you should be able to feel the IUD strings (which feel like fishing line), if you can't feel your strings or don't feel comfortable checking yourself you can call clinic to schedule an IUD check.

## 2024-10-09 NOTE — PROGRESS NOTES
Sailaja is here for a 6-week postpartum checkup.    She had a  of a viable girl, weight 6 pounds 4 oz., with no complications.  Since delivery, she has been breast feeding.  She has No signs of infection, bleeding or other complications.  She is not pregnant.  We discussed contraceptions and she has chosen Mirena IUD.  She denies feeling sad, depressed or too overwhelmed.    She has been having some persistent low back pain, but feels it is slowly getting better with exercise and stretching.        10/9/2024     2:20 PM   PHQ   PHQ-9 Total Score 1   Q9: Thoughts of better off dead/self-harm past 2 weeks Not at all         EXAM:    HEENT: grossly normal.  NECK: no lymphadenopathy or thyroidomegaly.  LUNGS: CTA X 2, no rales or crackles.  BACK: No spinal or CVA tenderness.  HEART: RRR without murmurs clicks or gallops.  ABDOMEN: soft, non tender, good bowel sounds, without masses rebound, guarding or tenderness.    PELVIC:    External genitalia: normal without lesion, repair well healed.                            Vagina: normal mucosa and rugae, no discharge.  Cervix: multiparous, well healed, without lesion.  Uterus: non pregnant in size, firm , mobile, no lesions.  Adnexa: non tender, without masses    EXTREMITIES: Warm to touch, good pulses, no ankle edema or calf tenderness.  NEUROLOGIC: grossly normal.    ASSESSMENT:   Normal 8-week postpartum exam after . Low back pain    PLAN:  Pap smear done and Mirena IUD for contraception. Offered PT for back pain, she feels it is improving and declines, but will let us know if she feels it would be helpful at some point.  RTC yearly or prn.    ARIEL SHEA MD      IUD Insertion:  CONSULT:    Is a pregnancy test required: Yes.  Was it positive or negative?  Negative  Was a consent obtained?  Yes    Subjective: Sailaja Mcneal is a 28 year old  presents for IUD and desires Mirena type IUD.    Patient has been given the opportunity to ask questions about  "all forms of birth control, including all options appropriate for Sailaja Mcneal. Discussed that no method of birth control, except abstinence is 100% effective against pregnancy or sexually transmitted infection.     Sailaja Mcneal understands she may have the IUD removed at any time. IUD should be removed by a health care provider.    The entire insertion procedure was reviewed with the patient, including care after placement.    Patient's last menstrual period was 11/15/2023. Last sexual activity: >8weeks ago . No allergy to betadine or shellfish. Patient declines STD screening  hCG Urine Qualitative   Date Value Ref Range Status   10/09/2024 Negative Negative Final     Comment:     This test is for screening purposes.  Results should be interpreted along with the clinical picture.  Confirmation testing is available if warranted by ordering QGC945, HCG Quantitative Pregnancy.         /70   Pulse 80   Temp 98.1  F (36.7  C)   Ht 1.499 m (4' 11\")   Wt 51.6 kg (113 lb 12.8 oz)   LMP 11/15/2023   SpO2 96%   Breastfeeding Yes   BMI 22.98 kg/m      Pelvic Exam:   EG/BUS: normal genital architecture without lesions, erythema or abnormal secretions.   Vagina: moist, pink, rugae with physiologic discharge and secretions  Cervix: multiparous no lesions and pink, moist, closed, without lesion or CMT  Uterus: anteverted position, mobile, no pain  Adnexa: within normal limits and no masses, nodularity, tenderness    PROCEDURE NOTE: -- IUD Insertion    Reason for Insertion: contraception    Premedicated with ibuprofen.  Under sterile technique, cervix was visualized with speculum and prepped with Betadine solution swab x 3. Tenaculum was placed for stability. The uterus was gently straightened and sounded to 7.0 cm. IUD prepared for placement, and IUD inserted according to 's instructions without difficulty or significant resitance, and deployed at the fundus. The strings were visualized and trimmed " to 3.0 cm from the external os. Tenaculum was removed and hemostasis noted. Speculum removed.  Patient tolerated procedure well.    EBL: minimal    Complications: none    ASSESSMENT:     ICD-10-CM    1. Encounter for pregnancy test, result unknown  Z32.00 HCG qualitative urine     HCG qualitative urine      2. Routine postpartum follow-up  Z39.2       3. Encounter for insertion of intrauterine contraceptive device  Z30.430 levonorgestrel (MIRENA) 52 MG (20 mcg/day) IUD 1 each     INSERTION INTRAUTERINE DEVICE             PLAN:    Given 's handouts, including when to have IUD removed, list of danger s/sx, side effects and follow up recommended. Encouraged condom use for prevention of STD. Back up contraception advised for 7 days if progestin method. Advised to call for any fever, for prolonged or severe pain or bleeding, abnormal vaginal discharge, or unable to palpate strings. She was advised to use pain medications (ibuprofen) as needed for mild to moderate pain. Advised to follow-up in clinic in 4-6 weeks for IUD string check if unable to find strings or as directed by provider.     Becki Nicole MD

## 2024-10-14 ENCOUNTER — MEDICAL CORRESPONDENCE (OUTPATIENT)
Dept: HEALTH INFORMATION MANAGEMENT | Facility: CLINIC | Age: 29
End: 2024-10-14
Payer: COMMERCIAL

## 2024-10-28 ENCOUNTER — VIRTUAL VISIT (OUTPATIENT)
Dept: FAMILY MEDICINE | Facility: CLINIC | Age: 29
End: 2024-10-28
Payer: COMMERCIAL

## 2024-10-28 ENCOUNTER — NURSE TRIAGE (OUTPATIENT)
Dept: FAMILY MEDICINE | Facility: CLINIC | Age: 29
End: 2024-10-28

## 2024-10-28 DIAGNOSIS — K64.4 EXTERNAL HEMORRHOIDS: Primary | ICD-10-CM

## 2024-10-28 DIAGNOSIS — K59.09 OTHER CONSTIPATION: ICD-10-CM

## 2024-10-28 PROCEDURE — 99442 PR PHYSICIAN TELEPHONE EVALUATION 11-20 MIN: CPT | Mod: 93

## 2024-10-28 RX ORDER — HYDROCORTISONE ACETATE 25 MG/1
25 SUPPOSITORY RECTAL 2 TIMES DAILY PRN
Qty: 12 SUPPOSITORY | Refills: 0 | Status: SHIPPED | OUTPATIENT
Start: 2024-10-28

## 2024-10-28 NOTE — PATIENT INSTRUCTIONS
"    OR any generic hemorrhoid cream that has these ingredients  - phenylephrine (vasoconstrictor that helps shrink the hemorrhoid)  - lidocaine (pain relief)     Here are my instructions on how to start having normal bowel habits.    CONSTIPATION = hard / rock-like or very large poops that are painful to go and / or require a lot of pushing to go; poops that do not happen frequently enough - only having a poop every 3 days. Constipation makes it more likely to have urinary tract infections (aka bladder infection or UTI), abdominal pain / stomach pain / pelvic pain, bloating, worse acid reflux aka indigestion, nausea, decreased appetite, etc.).     Goal at least 1, maybe 2, bowel movements (aka poops, \"stools\", \"BMs\") every single day.     Goal stool should be soft and formed (think playdough) so it holds shape but does not look dry/rock like or cause pain and doesn't make you sit and strain on the toilet for a long time.     Meds = FIBER (metamucil, psyllium husk - any type - gummies, capsules, powder are fine)  Take 1 dose every morning with full glass of water.   Sometimes this is enough without adding other meds.  Many people take this long term to stay regular.  Make sure you get enough water while taking fiber supplement. If you get dehydrated while taking fiber it can make constipation worse.   Also good for cholesterol levels.     Meds = MIRALAX (aka polyethylene glycol powder, any generic brand is fine)  Take 1 capful every morning or evening.   Often needed in addition to fiber initiate good bowel habits if you have been constipated for a long time.     FIRST - get things moving  Starting with step 1, move to next step only IF not having daily BM after 1-2 days.  Step 0: Increase fiber (whole grain, fruits, vegetables) and water intake.  Step 1: START daily fiber every morning  Step 2: ADD daily miralax powder 1 cap at night, I recommend evening, but ok in morning instead  Step 3: increase miralax to 2 caps " "at night  Step 4: increase miralax to 2 caps twice daily  (If you get to step 4, FIBER + MIRALAX 2 caps twice daily, and there is still no regular stools, schedule follow up).    SECOND - maintain a pattern  Continue moving up or down steps, so you are having daily BM for at least 1 month - ADJUST miralax and fiber as needed.   Example:  1 - You start having stools that are liquid, loose, watery, OR  that are more than twice per day.  - skip your daily dose of miralax for a day (or two). Restart miralax when your stools are again becoming soft/formed playdough consistency and/or 1-2 times per day. When you restart miralax, start with half your usual dose (if you were taking 1 cap every night, resume with only 1/2 cap).  Sometimes daily stool softening medications are needed for multiple weeks or months until your body gets into a normal routine / habits.   2 - You are having a daily, soft BM every day on Step 2 (FIBER  and 1 MIRALAX cap). You should continue this for 1 month, then try to move down to Step 1 by reducing MIRALAX to 1/2 cap and see if you continue having soft/formed stools.   3 - GOAL is to maintain a daily BM on fiber and water in your diet OR on a daily fiber supplement.      TIPS to prevent problems =   Listen to your body. If you feel the urge to go, GO. Feeling like you have to poop means there is stool in your colon/rectum ready to go. If you \"hold it\" your body will steal water out of the stool, makes it harder. Your body can also learn to stop listening to signals to go.     Do not sit on the toilet for a long time. If you try but can't go, do NOT SIT AND PUSH for a long time. Instead get up, drink a glass of warm water, walk around, try again. Do not sit on the toilet and look at your phone. The longer you sit, the more likely you are to get painful or bleeding hemorrhoids. This is because the small blood vessels and connective tissue in your rectum and anus     Foods that worsen constipation " : cheese, fatty meal, empty carbs like white bread, fried food, sometimes bananas.     Let me know what questions you have!    Sidney

## 2024-10-28 NOTE — TELEPHONE ENCOUNTER
"Patient calling to state she believes she has a hemerrhoid that has been itching for 2 weeks and is getting worse.    Today she noticed a little lump as well.    Is not painful, is not bleeding , she has no fever or abdominal pain.    Wanted a visit today as this is getting worse.    Scheduled virtual visit later today.    Christine M Klisch, RN    Reason for Disposition   Home treatment for > 3 days for rectal itching and not improved    Additional Information   Negative: Diarrhea is main symptom   Negative: Constipation is main symptom (e.g., pain or discomfort caused by passage of hard BMs)   Negative: Blood in or on bowel movement is main symptom   Negative: MPOX SUSPECTED (e.g., direct skin contact such as sex, recent travel to [place] or [place]) and any SYMPTOMS OF MPOX (e.g., rash, fever, muscle aches, or swollen lymph nodes)   Negative: At risk for Mpox (men-who-have-sex-with-men) and possible exposure (e.g., multiple sex partners in past 21 days) and ANY SYMPTOMS OF MPOX (e.g., rash, fever, muscle aches, or swollen lymph nodes)   Negative: Sexual assault or rape (sexual intercourse or activity occurs without freely given consent), known or suspected   Negative: Severe rectal pain   Negative: Rectal pain or redness and fever > 100.4 F (38.0 C)   Negative: Acute onset rectal pain and constipation (straining with rectal pressure or fullness), which is not relieved by Sitz bath or suppository    Answer Assessment - Initial Assessment Questions  1. SYMPTOM:  \"What's the main symptom you're concerned about?\" (e.g., pain, itching, swelling, rash)        Itching, irritation      2. ONSET: \"When did the itching  start?\"        Lump started this morning, itching started 2 weeks ago    3. RECTAL PAIN: \"Do you have any pain around your rectum?\" \"How bad is the pain?\"  (Scale 0-10; or mild, moderate, severe)    - NONE (0): no pain    - MILD (1-3): doesn't interfere with normal activities     - MODERATE (4-7): " "interferes with normal activities or awakens from sleep, limping     - SEVERE (8-10): excruciating pain, unable to have a bowel movement       No pain    4. RECTAL ITCHING: \"Do you have any itching in this area?\" \"How bad is the itching?\"  (Scale 0-10; or mild, moderate, severe)    - NONE: no itching    - MILD: doesn't interfere with normal activities     - MODERATE-SEVERE: interferes with normal activities or awakens from sleep      Itching is severe, thinks about often    5. CONSTIPATION: \"Do you have constipation?\" If Yes, ask: \"How bad is it?\"        Mildly constipated    6. CAUSE: \"What do you think is causing the anus symptoms?\"      Hemorrhoid?      7. OTHER SYMPTOMS: \"Do you have any other symptoms?\"  (e.g., abdomen pain, fever, rectal bleeding, vomiting)      No bleeding, but does have period.    8. PREGNANCY: \"Is there any chance you are pregnant?\" \"When was your last menstrual period?\"      Has now    Protocols used: Rectal Symptoms-A-[place]    "

## 2024-10-28 NOTE — PROGRESS NOTES
Sailaja is a 29 year old who is being evaluated via a billable telephone visit.    What phone number would you like to be contacted at? [phone]  How would you like to obtain your AVS? [facility]  Originating Location (pt. Location): Home    Distant Location (provider location):  On-site    Assessment & Plan     External hemorrhoids  Advised OTC treatments and establish normal bowel habits.   - hydrocortisone (ANUSOL-HC) 25 MG suppository  Dispense: 12 suppository; Refill: 0    Other constipation  Chronic, worse lately due to breastfeeding and recently sick with sore throat, wasn't drinking much water. Discussed OTC remedies.              See Patient Instructions    Subjective   Sailaja is a 29 year old, presenting for the following health issues:  Rectal Problem        10/28/2024     4:34 PM   Additional Questions   Roomed by Liana ELENA MA     History of Present Illness       Reason for visit:  Hemorrhoid  Symptom onset:  1-3 days ago  Symptoms include:  Itching  Symptom intensity:  Mild  Symptom progression:  Worsening  Had these symptoms before:  No  What makes it worse:  Going to toilet  What makes it better:  Heat compress and itching   She is taking medications regularly.     A few days ago, when going to bathroom, trying to pass bowel, feels like something is still there.   Lump in the anal area. No bleeding. Feeling irritated, only painful if touching it.   Notices it when relaxing, it feels irritated and itchy.     IUD placed two weeks ago and provider noted that she does have a small hemorrhoid. Was told to use cortisone 10, but has not tried it.   Patient is currently on menstrual cycle, LMP approx 10 days ago .    Hydrocortisone is not super helpful.                Review of Systems  Constitutional, HEENT, cardiovascular, pulmonary, gi and gu systems are negative, except as otherwise noted.      Objective           Vitals:  No vitals were obtained today due to virtual visit.    Physical Exam   General:  Alert and no distress //Respiratory: No audible wheeze, cough, or shortness of breath // Psychiatric:  Appropriate affect, tone, and pace of words            Phone call duration: 11 minutes  Signed Electronically by: VERENICE Bunch CNP

## 2024-12-23 ENCOUNTER — MEDICAL CORRESPONDENCE (OUTPATIENT)
Dept: HEALTH INFORMATION MANAGEMENT | Facility: CLINIC | Age: 29
End: 2024-12-23
Payer: COMMERCIAL

## 2025-03-06 ENCOUNTER — MEDICAL CORRESPONDENCE (OUTPATIENT)
Dept: HEALTH INFORMATION MANAGEMENT | Facility: CLINIC | Age: 30
End: 2025-03-06
Payer: COMMERCIAL

## 2025-03-15 ENCOUNTER — HEALTH MAINTENANCE LETTER (OUTPATIENT)
Age: 30
End: 2025-03-15

## 2025-05-12 ENCOUNTER — RESULTS FOLLOW-UP (OUTPATIENT)
Dept: FAMILY MEDICINE | Facility: CLINIC | Age: 30
End: 2025-05-12

## (undated) DEVICE — SOL WATER IRRIG 1000ML BOTTLE 07139-09

## (undated) RX ORDER — FENTANYL CITRATE 50 UG/ML
INJECTION, SOLUTION INTRAMUSCULAR; INTRAVENOUS
Status: DISPENSED
Start: 2022-03-16